# Patient Record
Sex: MALE | Race: WHITE | Employment: OTHER | URBAN - METROPOLITAN AREA
[De-identification: names, ages, dates, MRNs, and addresses within clinical notes are randomized per-mention and may not be internally consistent; named-entity substitution may affect disease eponyms.]

---

## 2020-10-12 ENCOUNTER — APPOINTMENT (OUTPATIENT)
Dept: CT IMAGING | Age: 70
DRG: 917 | End: 2020-10-12
Payer: MEDICARE

## 2020-10-12 ENCOUNTER — HOSPITAL ENCOUNTER (INPATIENT)
Age: 70
LOS: 5 days | Discharge: HOME | DRG: 917 | End: 2020-10-17
Attending: EMERGENCY MEDICINE | Admitting: INTERNAL MEDICINE
Payer: MEDICARE

## 2020-10-12 PROBLEM — R41.82 ALTERED MENTAL STATUS: Status: ACTIVE | Noted: 2020-10-12

## 2020-10-12 LAB
-: NORMAL
-: NORMAL
ABSOLUTE EOS #: <0.03 K/UL (ref 0–0.44)
ABSOLUTE IMMATURE GRANULOCYTE: 0.1 K/UL (ref 0–0.3)
ABSOLUTE LYMPH #: 0.93 K/UL (ref 1.1–3.7)
ABSOLUTE MONO #: 0.57 K/UL (ref 0.1–1.2)
ALBUMIN SERPL-MCNC: 4.6 G/DL (ref 3.5–5.2)
ALBUMIN/GLOBULIN RATIO: NORMAL (ref 1–2.5)
ALP BLD-CCNC: 65 U/L (ref 40–129)
ALT SERPL-CCNC: 8 U/L (ref 5–41)
AMORPHOUS: NORMAL
AMPHETAMINE SCREEN URINE: NEGATIVE
AMYLASE: 53 U/L (ref 28–100)
ANION GAP SERPL CALCULATED.3IONS-SCNC: 14 MMOL/L (ref 9–17)
AST SERPL-CCNC: 11 U/L
BACTERIA: NORMAL
BARBITURATE SCREEN URINE: NEGATIVE
BASOPHILS # BLD: 0 % (ref 0–2)
BASOPHILS ABSOLUTE: 0.05 K/UL (ref 0–0.2)
BENZODIAZEPINE SCREEN, URINE: NEGATIVE
BILIRUB SERPL-MCNC: 0.73 MG/DL (ref 0.3–1.2)
BILIRUBIN DIRECT: 0.2 MG/DL
BILIRUBIN URINE: NEGATIVE
BILIRUBIN, INDIRECT: 0.53 MG/DL (ref 0–1)
BUN BLDV-MCNC: 46 MG/DL (ref 8–23)
BUN/CREAT BLD: 32 (ref 9–20)
BUPRENORPHINE URINE: ABNORMAL
CALCIUM SERPL-MCNC: 10.4 MG/DL (ref 8.6–10.4)
CANNABINOID SCREEN URINE: POSITIVE
CASTS UA: NORMAL /LPF
CASTS UA: NORMAL /LPF
CHLORIDE BLD-SCNC: 102 MMOL/L (ref 98–107)
CO2: 22 MMOL/L (ref 20–31)
COCAINE METABOLITE, URINE: NEGATIVE
COLOR: YELLOW
COMMENT UA: ABNORMAL
CREAT SERPL-MCNC: 1.46 MG/DL (ref 0.7–1.2)
CRYSTALS, UA: NORMAL /HPF
DIFFERENTIAL TYPE: ABNORMAL
EOSINOPHILS RELATIVE PERCENT: 0 % (ref 1–4)
EPITHELIAL CELLS UA: NORMAL /HPF (ref 0–5)
GFR AFRICAN AMERICAN: 58 ML/MIN
GFR NON-AFRICAN AMERICAN: 48 ML/MIN
GFR SERPL CREATININE-BSD FRML MDRD: ABNORMAL ML/MIN/{1.73_M2}
GFR SERPL CREATININE-BSD FRML MDRD: ABNORMAL ML/MIN/{1.73_M2}
GLOBULIN: NORMAL G/DL (ref 1.5–3.8)
GLUCOSE BLD-MCNC: 175 MG/DL (ref 75–110)
GLUCOSE BLD-MCNC: 185 MG/DL (ref 70–99)
GLUCOSE URINE: ABNORMAL
HCT VFR BLD CALC: 48.7 % (ref 40.7–50.3)
HEMOGLOBIN: 16.1 G/DL (ref 13–17)
IMMATURE GRANULOCYTES: 1 %
KETONES, URINE: ABNORMAL
LACTIC ACID: 1.5 MMOL/L (ref 0.5–2.2)
LACTIC ACID: 2.6 MMOL/L (ref 0.5–2.2)
LEUKOCYTE ESTERASE, URINE: NEGATIVE
LIPASE: 15 U/L (ref 13–60)
LYMPHOCYTES # BLD: 7 % (ref 24–43)
MCH RBC QN AUTO: 30 PG (ref 25.2–33.5)
MCHC RBC AUTO-ENTMCNC: 33.1 G/DL (ref 28.4–34.8)
MCV RBC AUTO: 90.7 FL (ref 82.6–102.9)
MDMA URINE: ABNORMAL
METHADONE SCREEN, URINE: NEGATIVE
METHAMPHETAMINE, URINE: ABNORMAL
MONOCYTES # BLD: 4 % (ref 3–12)
MUCUS: NORMAL
MYOGLOBIN: 184 NG/ML (ref 28–72)
NITRITE, URINE: NEGATIVE
NRBC AUTOMATED: 0 PER 100 WBC
OPIATES, URINE: NEGATIVE
OTHER OBSERVATIONS UA: NORMAL
OXYCODONE SCREEN URINE: POSITIVE
PDW BLD-RTO: 13.2 % (ref 11.8–14.4)
PH UA: 6 (ref 5–8)
PHENCYCLIDINE, URINE: NEGATIVE
PLATELET # BLD: 335 K/UL (ref 138–453)
PLATELET ESTIMATE: ABNORMAL
PMV BLD AUTO: 9.2 FL (ref 8.1–13.5)
POTASSIUM SERPL-SCNC: 4.6 MMOL/L (ref 3.7–5.3)
PROPOXYPHENE, URINE: ABNORMAL
PROTEIN UA: ABNORMAL
RBC # BLD: 5.37 M/UL (ref 4.21–5.77)
RBC # BLD: ABNORMAL 10*6/UL
RBC UA: NORMAL /HPF (ref 0–2)
REASON FOR REJECTION: NORMAL
RENAL EPITHELIAL, UA: NORMAL /HPF
SARS-COV-2, RAPID: NOT DETECTED
SARS-COV-2: NORMAL
SARS-COV-2: NORMAL
SEG NEUTROPHILS: 88 % (ref 36–65)
SEGMENTED NEUTROPHILS ABSOLUTE COUNT: 12.42 K/UL (ref 1.5–8.1)
SODIUM BLD-SCNC: 138 MMOL/L (ref 135–144)
SOURCE: NORMAL
SPECIFIC GRAVITY UA: 1.02 (ref 1–1.03)
TEST INFORMATION: ABNORMAL
TOTAL PROTEIN: 7.2 G/DL (ref 6.4–8.3)
TRICHOMONAS: NORMAL
TRICYCLIC ANTIDEPRESSANTS, UR: ABNORMAL
TROPONIN INTERP: ABNORMAL
TROPONIN T: ABNORMAL NG/ML
TROPONIN, HIGH SENSITIVITY: 93 NG/L (ref 0–22)
TURBIDITY: CLEAR
URINE HGB: NEGATIVE
UROBILINOGEN, URINE: NORMAL
WBC # BLD: 14.1 K/UL (ref 3.5–11.3)
WBC # BLD: ABNORMAL 10*3/UL
WBC UA: NORMAL /HPF (ref 0–5)
YEAST: NORMAL
ZZ NTE CLEAN UP: ORDERED TEST: NORMAL
ZZ NTE WITH NAME CLEAN UP: SPECIMEN SOURCE: NORMAL

## 2020-10-12 PROCEDURE — 83690 ASSAY OF LIPASE: CPT

## 2020-10-12 PROCEDURE — 93005 ELECTROCARDIOGRAM TRACING: CPT | Performed by: NURSE PRACTITIONER

## 2020-10-12 PROCEDURE — 84484 ASSAY OF TROPONIN QUANT: CPT

## 2020-10-12 PROCEDURE — 80076 HEPATIC FUNCTION PANEL: CPT

## 2020-10-12 PROCEDURE — 80307 DRUG TEST PRSMV CHEM ANLYZR: CPT

## 2020-10-12 PROCEDURE — 85025 COMPLETE CBC W/AUTO DIFF WBC: CPT

## 2020-10-12 PROCEDURE — U0002 COVID-19 LAB TEST NON-CDC: HCPCS

## 2020-10-12 PROCEDURE — 99285 EMERGENCY DEPT VISIT HI MDM: CPT

## 2020-10-12 PROCEDURE — 84165 PROTEIN E-PHORESIS SERUM: CPT

## 2020-10-12 PROCEDURE — 83605 ASSAY OF LACTIC ACID: CPT

## 2020-10-12 PROCEDURE — 82150 ASSAY OF AMYLASE: CPT

## 2020-10-12 PROCEDURE — 84155 ASSAY OF PROTEIN SERUM: CPT

## 2020-10-12 PROCEDURE — 81001 URINALYSIS AUTO W/SCOPE: CPT

## 2020-10-12 PROCEDURE — 84156 ASSAY OF PROTEIN URINE: CPT

## 2020-10-12 PROCEDURE — 70450 CT HEAD/BRAIN W/O DYE: CPT

## 2020-10-12 PROCEDURE — 2580000003 HC RX 258: Performed by: NURSE PRACTITIONER

## 2020-10-12 PROCEDURE — 6360000002 HC RX W HCPCS: Performed by: NURSE PRACTITIONER

## 2020-10-12 PROCEDURE — 83874 ASSAY OF MYOGLOBIN: CPT

## 2020-10-12 PROCEDURE — 80048 BASIC METABOLIC PNL TOTAL CA: CPT

## 2020-10-12 PROCEDURE — 82947 ASSAY GLUCOSE BLOOD QUANT: CPT

## 2020-10-12 PROCEDURE — 2060000000 HC ICU INTERMEDIATE R&B

## 2020-10-12 PROCEDURE — 84166 PROTEIN E-PHORESIS/URINE/CSF: CPT

## 2020-10-12 PROCEDURE — 86334 IMMUNOFIX E-PHORESIS SERUM: CPT

## 2020-10-12 RX ORDER — ACETAMINOPHEN 325 MG/1
650 TABLET ORAL EVERY 4 HOURS PRN
Status: DISCONTINUED | OUTPATIENT
Start: 2020-10-12 | End: 2020-10-12 | Stop reason: SDUPTHER

## 2020-10-12 RX ORDER — INSULIN GLARGINE 300 U/ML
40 INJECTION, SOLUTION SUBCUTANEOUS DAILY
COMMUNITY

## 2020-10-12 RX ORDER — PREGABALIN 25 MG/1
25 CAPSULE ORAL DAILY
COMMUNITY

## 2020-10-12 RX ORDER — SODIUM CHLORIDE 9 MG/ML
INJECTION, SOLUTION INTRAVENOUS CONTINUOUS
Status: DISCONTINUED | OUTPATIENT
Start: 2020-10-12 | End: 2020-10-17 | Stop reason: HOSPADM

## 2020-10-12 RX ORDER — SODIUM CHLORIDE 0.9 % (FLUSH) 0.9 %
10 SYRINGE (ML) INJECTION EVERY 12 HOURS SCHEDULED
Status: DISCONTINUED | OUTPATIENT
Start: 2020-10-12 | End: 2020-10-17 | Stop reason: HOSPADM

## 2020-10-12 RX ORDER — MORPHINE SULFATE 4 MG/ML
4 INJECTION, SOLUTION INTRAMUSCULAR; INTRAVENOUS ONCE
Status: COMPLETED | OUTPATIENT
Start: 2020-10-12 | End: 2020-10-12

## 2020-10-12 RX ORDER — LEVOTHYROXINE SODIUM 0.03 MG/1
25 TABLET ORAL
COMMUNITY

## 2020-10-12 RX ORDER — LEVOTHYROXINE SODIUM 0.03 MG/1
25 TABLET ORAL
Status: DISCONTINUED | OUTPATIENT
Start: 2020-10-13 | End: 2020-10-17 | Stop reason: HOSPADM

## 2020-10-12 RX ORDER — AMITRIPTYLINE HYDROCHLORIDE 50 MG/1
50 TABLET, FILM COATED ORAL 2 TIMES DAILY
COMMUNITY

## 2020-10-12 RX ORDER — ONDANSETRON 2 MG/ML
4 INJECTION INTRAMUSCULAR; INTRAVENOUS EVERY 6 HOURS PRN
Status: DISCONTINUED | OUTPATIENT
Start: 2020-10-12 | End: 2020-10-17 | Stop reason: HOSPADM

## 2020-10-12 RX ORDER — CLOPIDOGREL BISULFATE 75 MG/1
75 TABLET ORAL DAILY
Status: DISCONTINUED | OUTPATIENT
Start: 2020-10-13 | End: 2020-10-17 | Stop reason: HOSPADM

## 2020-10-12 RX ORDER — ONDANSETRON 2 MG/ML
4 INJECTION INTRAMUSCULAR; INTRAVENOUS EVERY 8 HOURS PRN
Status: DISCONTINUED | OUTPATIENT
Start: 2020-10-12 | End: 2020-10-12 | Stop reason: SDUPTHER

## 2020-10-12 RX ORDER — ONDANSETRON 2 MG/ML
4 INJECTION INTRAMUSCULAR; INTRAVENOUS ONCE
Status: COMPLETED | OUTPATIENT
Start: 2020-10-12 | End: 2020-10-12

## 2020-10-12 RX ORDER — OXYCODONE HYDROCHLORIDE AND ACETAMINOPHEN 5; 325 MG/1; MG/1
1 TABLET ORAL 2 TIMES DAILY PRN
COMMUNITY

## 2020-10-12 RX ORDER — SODIUM CHLORIDE 0.9 % (FLUSH) 0.9 %
10 SYRINGE (ML) INJECTION EVERY 12 HOURS SCHEDULED
Status: DISCONTINUED | OUTPATIENT
Start: 2020-10-12 | End: 2020-10-12 | Stop reason: SDUPTHER

## 2020-10-12 RX ORDER — 0.9 % SODIUM CHLORIDE 0.9 %
1000 INTRAVENOUS SOLUTION INTRAVENOUS ONCE
Status: COMPLETED | OUTPATIENT
Start: 2020-10-12 | End: 2020-10-12

## 2020-10-12 RX ORDER — ALPRAZOLAM 1 MG/1
1 TABLET ORAL DAILY
Status: ON HOLD | COMMUNITY
End: 2020-10-17 | Stop reason: HOSPADM

## 2020-10-12 RX ORDER — ACETAMINOPHEN 650 MG/1
650 SUPPOSITORY RECTAL EVERY 6 HOURS PRN
Status: DISCONTINUED | OUTPATIENT
Start: 2020-10-12 | End: 2020-10-17 | Stop reason: HOSPADM

## 2020-10-12 RX ORDER — AMITRIPTYLINE HYDROCHLORIDE 50 MG/1
50 TABLET, FILM COATED ORAL 2 TIMES DAILY
Status: DISCONTINUED | OUTPATIENT
Start: 2020-10-12 | End: 2020-10-17 | Stop reason: HOSPADM

## 2020-10-12 RX ORDER — ACETAMINOPHEN 325 MG/1
650 TABLET ORAL EVERY 6 HOURS PRN
Status: DISCONTINUED | OUTPATIENT
Start: 2020-10-12 | End: 2020-10-17 | Stop reason: HOSPADM

## 2020-10-12 RX ORDER — PROMETHAZINE HYDROCHLORIDE 25 MG/1
12.5 TABLET ORAL EVERY 6 HOURS PRN
Status: DISCONTINUED | OUTPATIENT
Start: 2020-10-12 | End: 2020-10-17 | Stop reason: HOSPADM

## 2020-10-12 RX ORDER — CLOPIDOGREL BISULFATE 75 MG/1
75 TABLET ORAL DAILY
COMMUNITY

## 2020-10-12 RX ORDER — POLYETHYLENE GLYCOL 3350 17 G/17G
17 POWDER, FOR SOLUTION ORAL DAILY PRN
Status: DISCONTINUED | OUTPATIENT
Start: 2020-10-12 | End: 2020-10-17 | Stop reason: HOSPADM

## 2020-10-12 RX ORDER — SODIUM CHLORIDE 0.9 % (FLUSH) 0.9 %
10 SYRINGE (ML) INJECTION PRN
Status: DISCONTINUED | OUTPATIENT
Start: 2020-10-12 | End: 2020-10-12 | Stop reason: SDUPTHER

## 2020-10-12 RX ORDER — SODIUM CHLORIDE 0.9 % (FLUSH) 0.9 %
10 SYRINGE (ML) INJECTION PRN
Status: DISCONTINUED | OUTPATIENT
Start: 2020-10-12 | End: 2020-10-17 | Stop reason: HOSPADM

## 2020-10-12 RX ORDER — PREGABALIN 25 MG/1
25 CAPSULE ORAL DAILY
Status: DISCONTINUED | OUTPATIENT
Start: 2020-10-13 | End: 2020-10-17 | Stop reason: HOSPADM

## 2020-10-12 RX ADMIN — MORPHINE SULFATE 4 MG: 4 INJECTION, SOLUTION INTRAMUSCULAR; INTRAVENOUS at 16:34

## 2020-10-12 RX ADMIN — SODIUM CHLORIDE 1000 ML: 9 INJECTION, SOLUTION INTRAVENOUS at 14:40

## 2020-10-12 RX ADMIN — SODIUM CHLORIDE: 9 INJECTION, SOLUTION INTRAVENOUS at 22:06

## 2020-10-12 RX ADMIN — ONDANSETRON 4 MG: 2 INJECTION INTRAMUSCULAR; INTRAVENOUS at 15:31

## 2020-10-12 RX ADMIN — SODIUM CHLORIDE 1000 ML: 9 INJECTION, SOLUTION INTRAVENOUS at 11:23

## 2020-10-12 ASSESSMENT — ENCOUNTER SYMPTOMS
CONSTIPATION: 0
NAUSEA: 0
VOMITING: 0
DIARRHEA: 0
COLOR CHANGE: 0
COUGH: 0
SHORTNESS OF BREATH: 0
RHINORRHEA: 0
SORE THROAT: 0
WHEEZING: 0
SINUS PRESSURE: 0
ABDOMINAL PAIN: 0

## 2020-10-12 ASSESSMENT — PAIN SCALES - GENERAL
PAINLEVEL_OUTOF10: 0
PAINLEVEL_OUTOF10: 4
PAINLEVEL_OUTOF10: 10

## 2020-10-12 NOTE — ED PROVIDER NOTES
Neck:      Musculoskeletal: Normal range of motion and neck supple. Cardiovascular:      Rate and Rhythm: Normal rate and regular rhythm. Pulmonary:      Effort: Pulmonary effort is normal. No respiratory distress. Breath sounds: Normal breath sounds. No stridor. Abdominal:      General: Bowel sounds are normal.      Palpations: Abdomen is soft. Musculoskeletal: Normal range of motion. Lymphadenopathy:      Cervical: No cervical adenopathy. Skin:     General: Skin is warm and dry. Findings: No rash. Neurological:      Mental Status: He is alert and oriented to person, place, and time. RADIOLOGY:   Non-plain film images such as CT, Ultrasound and MRI are read by the radiologist. [facility] radiographic images are visualized and preliminarily interpreted by the emergency physician with the below findings:    Ct Head Wo Contrast    Result Date: 10/12/2020  EXAMINATION: CT OF THE HEAD WITHOUT CONTRAST  10/12/2020 12:30 pm TECHNIQUE: CT of the head was performed without the administration of intravenous contrast. Dose modulation, iterative reconstruction, and/or weight based adjustment of the mA/kV was utilized to reduce the radiation dose to as low as reasonably achievable. COMPARISON: None. HISTORY: ORDERING SYSTEM PROVIDED HISTORY: AMS TECHNOLOGIST PROVIDED HISTORY: AMS Reason for Exam: ams Acuity: Unknown Type of Exam: Unknown FINDINGS: BRAIN/VENTRICLES: The ventricles and sulci are diffusely enlarged. Low attenuation is seen in the periventricular and subcortical white matter. No acute intracranial hemorrhage or acute infarct is identified. ORBITS: The visualized portion of the orbits demonstrate no acute abnormality. SINUSES: The visualized paranasal sinuses and mastoid air cells demonstrate no acute abnormality. SOFT TISSUES/SKULL:  No acute abnormality of the visualized skull or soft tissues. No acute intracranial abnormality.      Interpretation per the Radiologist below, if available at the time of this note:    CT HEAD WO CONTRAST   Final Result   No acute intracranial abnormality. LABS:  Labs Reviewed   CBC WITH AUTO DIFFERENTIAL - Abnormal; Notable for the following components:       Result Value    WBC 14.1 (*)     Seg Neutrophils 88 (*)     Lymphocytes 7 (*)     Eosinophils % 0 (*)     Immature Granulocytes 1 (*)     Segs Absolute 12.42 (*)     Absolute Lymph # 0.93 (*)     All other components within normal limits   URINE RT REFLEX TO CULTURE - Abnormal; Notable for the following components:    Glucose, Ur 1+ (*)     Ketones, Urine 1+ (*)     Protein, UA 2+ (*)     All other components within normal limits   LACTIC ACID - Abnormal; Notable for the following components:    Lactic Acid 2.6 (*)     All other components within normal limits   BASIC METABOLIC PANEL - Abnormal; Notable for the following components:    Glucose 185 (*)     BUN 46 (*)     CREATININE 1.46 (*)     Bun/Cre Ratio 32 (*)     GFR Non- 48 (*)     GFR  58 (*)     All other components within normal limits   POC GLUCOSE FINGERSTICK - Abnormal; Notable for the following components:    POC Glucose 175 (*)     All other components within normal limits   AMYLASE   LIPASE   HEPATIC FUNCTION PANEL   SPECIMEN REJECTION   MICROSCOPIC URINALYSIS   LACTIC ACID   COVID-19       All other labs were within normal range or not returned as of this dictation.     EMERGENCY DEPARTMENT COURSE and DIFFERENTIAL DIAGNOSIS/MDM:   Vitals:    Vitals:    10/12/20 1006 10/12/20 1500   BP: (!) 115/100 138/87   Pulse:  78   Resp: 16 16   Temp: 98.5 °F (36.9 °C)    TempSrc: Oral    SpO2: 96% 100%   Weight: 120 lb (54.4 kg)    Height: 5' 9\" (1.753 m)        Medical Decision Making:  Medications   0.9 % sodium chloride bolus (0 mLs Intravenous Stopped 10/12/20 1241)   ondansetron (ZOFRAN) injection 4 mg (4 mg Intravenous Given 10/12/20 1531)   0.9 % sodium chloride bolus (0 mLs Intravenous Stopped 10/12/20 1531)   morphine sulfate (PF) injection 4 mg (4 mg Intravenous Given 10/12/20 1634)       CONSULTS:  IP CONSULT TO INTERNAL MEDICINE        FINAL IMPRESSION      1. Altered mental status, unspecified altered mental status type    2. Nausea and vomiting, intractability of vomiting not specified, unspecified vomiting type          DISPOSITION/PLAN   DISPOSITION Admitted 10/12/2020 03:16:03 PM      PATIENT REFERRED TO:   No follow-up provider specified.     DISCHARGE MEDICATIONS:     New Prescriptions    No medications on file           (Please note that portions of this note were completed with a voice recognition program.  Efforts were made to edit the dictations but occasionally words are mis-transcribed.)    Enriqueta Gutierrez NP, APRN - CNP  Certified Nurse Practitioner          ANDRADE Romano - CNP  10/12/20 5759

## 2020-10-12 NOTE — PROGRESS NOTES
Transitions of Care Pharmacy Service   Medication Review    The patient's list of current home medications has been reviewed. Source(s) of information: patient, [employer], [facility]      Please feel free to call me with any questions about this encounter. Thank you. Tyra Mccormick [facility]   Transitions of Care Pharmacy Service  Phone:  [phone]  Fax: [phone]      Electronically signed by Tyra Mccormick [facility] on 10/12/2020 at 3:47 PM         Prior to Admission medications    Medication Sig       ALPRAZolam (XANAX) 1 MG tablet Take 1 mg by mouth daily. pregabalin (LYRICA) 25 MG capsule Take 25 mg by mouth daily.        Insulin Glargine, 1 Unit Dial, (TOUJEO SOLOSTAR) 300 UNIT/ML SOPN Inject 40 Units into the skin daily       amitriptyline (ELAVIL) 50 MG tablet Take 50 mg by mouth 2 times daily       oxyCODONE-acetaminophen (PERCOCET) 5-325 MG per tablet Take 1 tablet by mouth 2 times daily as needed for Pain.       levothyroxine (SYNTHROID) 25 MCG tablet Take 25 mcg by mouth every morning (before breakfast)       clopidogrel (PLAVIX) 75 MG tablet Take 75 mg by mouth daily

## 2020-10-12 NOTE — ED PROVIDER NOTES
[facility] ED  EMERGENCY DEPARTMENT ENCOUNTER   ATTENDING ATTESTATION     Pt Name: Corinne Redd  MRN: [id number]  Kathie 1950  Date of evaluation: 10/12/20       Corinne Redd is a 69 y.o. male who presents with Altered Mental Status (x 2 days) and Emesis (x 3 days)      MDM:     Patient's EKG shows sinus rhythm with a rate of 82, IA QRS QTC intervals unremarkable and the patient has left axis deviation no ST elevations or depressions, no significant T wave changes. Nonspecific EKG. Vitals:   Vitals:    10/12/20 1006   BP: (!) 115/100   Resp: 16   Temp: 98.5 °F (36.9 °C)   TempSrc: Oral   SpO2: 96%   Weight: 120 lb (54.4 kg)   Height: 5' 9\" (1.753 m)         I personally evaluated and examined the patient in conjunction with the Midlevel provider and agree with the assessment, treatment plan, and disposition of the patient as recorded by the midlevel. I performed a history and physical examination of the patient and discussed management with the midlevel. I reviewed the midlevels note and agree with the documented findings and plan of care. Any areas of disagreement are noted on the chart. I was personally present for the key portions of any procedures. I have documented in the chart those procedures where I was not present during the key portions. I have personally reviewed all images and agree with the midlevel's interpretation. I have reviewed the emergency nurses triage note. I agree with the chief complaint, past medical history, past surgical history, allergies, medications, social and family history as documented unless otherwise noted.     Ellie Jamil MD  Attending Emergency  Physician                  Jay Heart MD  10/12/20 8262

## 2020-10-12 NOTE — H&P
History and Physical/admit note      CHIEF COMPLAINT: Confusion dysarthria    History of Present Illness: 2-day history of confusion dysarthria no weakness in the arms on the legs no area of slurred speech has been having recurrent falls question no syncope, patient with known coronary artery disease and previous stents, denies any seizure disorder no dizziness, has been having 2 to 3 days of nausea vomiting and diarrhea vague upper abdominal pain no blood in the stools and no melena no hematemesis denies any fever chills no cough        Past Medical History:   Diagnosis Date    CAD (coronary artery disease)     Diabetes mellitus ([address]     Headache     Hyperlipidemia     Hypertension     Kidney stone          Past Surgical History:   Procedure Laterality Date    CARDIAC SURGERY      CHOLECYSTECTOMY         Medications Prior to Admission:    Not in a hospital admission. Allergies:    Patient has no known allergies. Social History:    reports that he has never smoked. He has never used smokeless tobacco. He reports current alcohol use. He reports that he does not use drugs. Family History:   family history includes Cancer in his sister; Coronary Art Dis in his father; Heart Attack in his father; Mental Illness in his mother.     REVIEW OF SYSTEMS:  Constitutional: negative, Fever no chills  Eyes: negative  Ears, nose, mouth, throat, and face: negative  Respiratory: negative, Cough no shortness of breath no wheezing  Cardiovascular: negative, Chest pain no palpitation positive syncope  Gastrointestinal: negative, Nausea vomiting diarrhea resolved x2 days  Genitourinary:negative  Integument/breast: negative  Hematologic/lymphatic: negative  Musculoskeletal:negative  Neurological: negative, Headache no seizures  Behavioral/Psych: negative  Endocrine: negative, Polyuria no polydipsia no hypoglycemia  Allergic/Immunologic: negative  PHYSICAL EXAM:  General Appearance: in no acute distress and neurology consultation aspirin EPC cuffs neurochecks beta-blocker            Tyree Elizondo MD  5:39 PM  10/12/2020

## 2020-10-13 ENCOUNTER — APPOINTMENT (OUTPATIENT)
Dept: MRI IMAGING | Age: 70
DRG: 917 | End: 2020-10-13
Payer: MEDICARE

## 2020-10-13 ENCOUNTER — APPOINTMENT (OUTPATIENT)
Dept: ULTRASOUND IMAGING | Age: 70
DRG: 917 | End: 2020-10-13
Payer: MEDICARE

## 2020-10-13 PROBLEM — Z79.4 TYPE 2 DIABETES MELLITUS WITH DIABETIC NEUROPATHY, WITH LONG-TERM CURRENT USE OF INSULIN (HCC): Status: ACTIVE | Noted: 2020-10-13

## 2020-10-13 PROBLEM — E11.40 TYPE 2 DIABETES MELLITUS WITH DIABETIC NEUROPATHY, WITH LONG-TERM CURRENT USE OF INSULIN (HCC): Status: ACTIVE | Noted: 2020-10-13

## 2020-10-13 PROBLEM — G93.41 METABOLIC ENCEPHALOPATHY: Status: ACTIVE | Noted: 2020-10-13

## 2020-10-13 PROBLEM — R29.6 FALLING EPISODES: Status: ACTIVE | Noted: 2020-10-13

## 2020-10-13 LAB
-: NORMAL
ABSOLUTE EOS #: <0.03 K/UL (ref 0–0.44)
ABSOLUTE IMMATURE GRANULOCYTE: 0.08 K/UL (ref 0–0.3)
ABSOLUTE LYMPH #: 0.87 K/UL (ref 1.1–3.7)
ABSOLUTE MONO #: 0.67 K/UL (ref 0.1–1.2)
ALBUMIN SERPL-MCNC: 4.6 G/DL (ref 3.5–5.2)
ALBUMIN/GLOBULIN RATIO: ABNORMAL (ref 1–2.5)
ALP BLD-CCNC: 60 U/L (ref 40–129)
ALT SERPL-CCNC: 9 U/L (ref 5–41)
AMORPHOUS: NORMAL
AMPHETAMINE SCREEN URINE: NEGATIVE
ANION GAP SERPL CALCULATED.3IONS-SCNC: 14 MMOL/L (ref 9–17)
AST SERPL-CCNC: 15 U/L
BACTERIA: NORMAL
BARBITURATE SCREEN URINE: NEGATIVE
BASOPHILS # BLD: 0 % (ref 0–2)
BASOPHILS ABSOLUTE: 0.04 K/UL (ref 0–0.2)
BENZODIAZEPINE SCREEN, URINE: NEGATIVE
BILIRUB SERPL-MCNC: 0.82 MG/DL (ref 0.3–1.2)
BILIRUBIN URINE: NEGATIVE
BUN BLDV-MCNC: 38 MG/DL (ref 8–23)
BUN/CREAT BLD: 36 (ref 9–20)
BUPRENORPHINE URINE: ABNORMAL
CALCIUM SERPL-MCNC: 9.5 MG/DL (ref 8.6–10.4)
CANNABINOID SCREEN URINE: POSITIVE
CASTS UA: NORMAL /LPF
CHLORIDE BLD-SCNC: 103 MMOL/L (ref 98–107)
CO2: 23 MMOL/L (ref 20–31)
COCAINE METABOLITE, URINE: NEGATIVE
COLOR: YELLOW
COMMENT UA: ABNORMAL
CREAT SERPL-MCNC: 1.06 MG/DL (ref 0.7–1.2)
CRYSTALS, UA: NORMAL /HPF
DIFFERENTIAL TYPE: ABNORMAL
EKG ATRIAL RATE: 82 BPM
EKG ATRIAL RATE: 86 BPM
EKG P AXIS: 71 DEGREES
EKG P AXIS: 73 DEGREES
EKG P-R INTERVAL: 154 MS
EKG P-R INTERVAL: 162 MS
EKG Q-T INTERVAL: 366 MS
EKG Q-T INTERVAL: 392 MS
EKG QRS DURATION: 118 MS
EKG QRS DURATION: 122 MS
EKG QTC CALCULATION (BAZETT): 437 MS
EKG QTC CALCULATION (BAZETT): 457 MS
EKG R AXIS: -11 DEGREES
EKG R AXIS: -37 DEGREES
EKG T AXIS: 116 DEGREES
EKG T AXIS: 130 DEGREES
EKG VENTRICULAR RATE: 82 BPM
EKG VENTRICULAR RATE: 86 BPM
EOSINOPHILS RELATIVE PERCENT: 0 % (ref 1–4)
EPITHELIAL CELLS UA: NORMAL /HPF (ref 0–5)
GFR AFRICAN AMERICAN: >60 ML/MIN
GFR NON-AFRICAN AMERICAN: >60 ML/MIN
GFR SERPL CREATININE-BSD FRML MDRD: ABNORMAL ML/MIN/{1.73_M2}
GFR SERPL CREATININE-BSD FRML MDRD: ABNORMAL ML/MIN/{1.73_M2}
GLUCOSE BLD-MCNC: 119 MG/DL (ref 75–110)
GLUCOSE BLD-MCNC: 119 MG/DL (ref 75–110)
GLUCOSE BLD-MCNC: 138 MG/DL (ref 70–99)
GLUCOSE BLD-MCNC: 138 MG/DL (ref 75–110)
GLUCOSE URINE: ABNORMAL
HCT VFR BLD CALC: 42.8 % (ref 40.7–50.3)
HEMOGLOBIN: 14.4 G/DL (ref 13–17)
IMMATURE GRANULOCYTES: 1 %
KETONES, URINE: ABNORMAL
LEUKOCYTE ESTERASE, URINE: NEGATIVE
LV EF: 55 %
LVEF MODALITY: NORMAL
LYMPHOCYTES # BLD: 7 % (ref 24–43)
MAGNESIUM: 2.2 MG/DL (ref 1.6–2.6)
MCH RBC QN AUTO: 30.6 PG (ref 25.2–33.5)
MCHC RBC AUTO-ENTMCNC: 33.6 G/DL (ref 28.4–34.8)
MCV RBC AUTO: 90.9 FL (ref 82.6–102.9)
MDMA URINE: ABNORMAL
METHADONE SCREEN, URINE: NEGATIVE
METHAMPHETAMINE, URINE: ABNORMAL
MONOCYTES # BLD: 6 % (ref 3–12)
MUCUS: NORMAL
MYOGLOBIN: 190 NG/ML (ref 28–72)
MYOGLOBIN: 195 NG/ML (ref 28–72)
NITRITE, URINE: NEGATIVE
NRBC AUTOMATED: 0 PER 100 WBC
OPIATES, URINE: POSITIVE
OTHER OBSERVATIONS UA: NORMAL
OXYCODONE SCREEN URINE: POSITIVE
PDW BLD-RTO: 12.9 % (ref 11.8–14.4)
PH UA: 6.5 (ref 5–8)
PHENCYCLIDINE, URINE: NEGATIVE
PLATELET # BLD: 286 K/UL (ref 138–453)
PLATELET ESTIMATE: ABNORMAL
PMV BLD AUTO: 9 FL (ref 8.1–13.5)
POTASSIUM SERPL-SCNC: 3.5 MMOL/L (ref 3.7–5.3)
PROPOXYPHENE, URINE: ABNORMAL
PROTEIN UA: ABNORMAL
RBC # BLD: 4.71 M/UL (ref 4.21–5.77)
RBC # BLD: ABNORMAL 10*6/UL
RBC UA: NORMAL /HPF (ref 0–2)
RENAL EPITHELIAL, UA: NORMAL /HPF
SEG NEUTROPHILS: 86 % (ref 36–65)
SEGMENTED NEUTROPHILS ABSOLUTE COUNT: 10.49 K/UL (ref 1.5–8.1)
SODIUM BLD-SCNC: 140 MMOL/L (ref 135–144)
SPECIFIC GRAVITY UA: 1.02 (ref 1–1.03)
T4 TOTAL: 8.5 UG/DL (ref 4.5–10.9)
TEST INFORMATION: ABNORMAL
TOTAL PROTEIN: 7 G/DL (ref 6.4–8.3)
TRICHOMONAS: NORMAL
TRICYCLIC ANTIDEPRESSANTS, UR: ABNORMAL
TROPONIN INTERP: ABNORMAL
TROPONIN INTERP: ABNORMAL
TROPONIN T: ABNORMAL NG/ML
TROPONIN T: ABNORMAL NG/ML
TROPONIN, HIGH SENSITIVITY: 121 NG/L (ref 0–22)
TROPONIN, HIGH SENSITIVITY: 150 NG/L (ref 0–22)
TSH SERPL DL<=0.05 MIU/L-ACNC: 2.29 MIU/L (ref 0.3–5)
TURBIDITY: CLEAR
URINE HGB: ABNORMAL
UROBILINOGEN, URINE: NORMAL
WBC # BLD: 12.2 K/UL (ref 3.5–11.3)
WBC # BLD: ABNORMAL 10*3/UL
WBC UA: NORMAL /HPF (ref 0–5)
YEAST: NORMAL

## 2020-10-13 PROCEDURE — 93970 EXTREMITY STUDY: CPT

## 2020-10-13 PROCEDURE — 6370000000 HC RX 637 (ALT 250 FOR IP): Performed by: INTERNAL MEDICINE

## 2020-10-13 PROCEDURE — 80053 COMPREHEN METABOLIC PANEL: CPT

## 2020-10-13 PROCEDURE — 97116 GAIT TRAINING THERAPY: CPT

## 2020-10-13 PROCEDURE — 2060000000 HC ICU INTERMEDIATE R&B

## 2020-10-13 PROCEDURE — 2580000003 HC RX 258: Performed by: INTERNAL MEDICINE

## 2020-10-13 PROCEDURE — 82947 ASSAY GLUCOSE BLOOD QUANT: CPT

## 2020-10-13 PROCEDURE — 92523 SPEECH SOUND LANG COMPREHEN: CPT

## 2020-10-13 PROCEDURE — 36415 COLL VENOUS BLD VENIPUNCTURE: CPT

## 2020-10-13 PROCEDURE — 93005 ELECTROCARDIOGRAM TRACING: CPT | Performed by: INTERNAL MEDICINE

## 2020-10-13 PROCEDURE — 84436 ASSAY OF TOTAL THYROXINE: CPT

## 2020-10-13 PROCEDURE — 97162 PT EVAL MOD COMPLEX 30 MIN: CPT

## 2020-10-13 PROCEDURE — 93306 TTE W/DOPPLER COMPLETE: CPT

## 2020-10-13 PROCEDURE — 76775 US EXAM ABDO BACK WALL LIM: CPT

## 2020-10-13 PROCEDURE — 83874 ASSAY OF MYOGLOBIN: CPT

## 2020-10-13 PROCEDURE — 92610 EVALUATE SWALLOWING FUNCTION: CPT

## 2020-10-13 PROCEDURE — 99222 1ST HOSP IP/OBS MODERATE 55: CPT | Performed by: PSYCHIATRY & NEUROLOGY

## 2020-10-13 PROCEDURE — 6360000002 HC RX W HCPCS: Performed by: INTERNAL MEDICINE

## 2020-10-13 PROCEDURE — 83735 ASSAY OF MAGNESIUM: CPT

## 2020-10-13 PROCEDURE — 84484 ASSAY OF TROPONIN QUANT: CPT

## 2020-10-13 PROCEDURE — 81001 URINALYSIS AUTO W/SCOPE: CPT

## 2020-10-13 PROCEDURE — 84443 ASSAY THYROID STIM HORMONE: CPT

## 2020-10-13 PROCEDURE — 70551 MRI BRAIN STEM W/O DYE: CPT

## 2020-10-13 PROCEDURE — 93880 EXTRACRANIAL BILAT STUDY: CPT

## 2020-10-13 PROCEDURE — 93010 ELECTROCARDIOGRAM REPORT: CPT | Performed by: INTERNAL MEDICINE

## 2020-10-13 PROCEDURE — 2500000003 HC RX 250 WO HCPCS: Performed by: INTERNAL MEDICINE

## 2020-10-13 PROCEDURE — 80307 DRUG TEST PRSMV CHEM ANLYZR: CPT

## 2020-10-13 PROCEDURE — 85025 COMPLETE CBC W/AUTO DIFF WBC: CPT

## 2020-10-13 RX ORDER — HYDRALAZINE HYDROCHLORIDE 20 MG/ML
10 INJECTION INTRAMUSCULAR; INTRAVENOUS EVERY 6 HOURS PRN
Status: DISCONTINUED | OUTPATIENT
Start: 2020-10-13 | End: 2020-10-17 | Stop reason: HOSPADM

## 2020-10-13 RX ORDER — POTASSIUM CHLORIDE 7.45 MG/ML
10 INJECTION INTRAVENOUS
Status: COMPLETED | OUTPATIENT
Start: 2020-10-13 | End: 2020-10-13

## 2020-10-13 RX ORDER — ASPIRIN 81 MG/1
324 TABLET, CHEWABLE ORAL ONCE
Status: COMPLETED | OUTPATIENT
Start: 2020-10-13 | End: 2020-10-13

## 2020-10-13 RX ORDER — LORAZEPAM 0.5 MG/1
0.5 TABLET ORAL 2 TIMES DAILY PRN
Status: DISCONTINUED | OUTPATIENT
Start: 2020-10-13 | End: 2020-10-17 | Stop reason: HOSPADM

## 2020-10-13 RX ORDER — HYDROCHLOROTHIAZIDE 25 MG/1
25 TABLET ORAL DAILY
Status: DISCONTINUED | OUTPATIENT
Start: 2020-10-14 | End: 2020-10-14 | Stop reason: CLARIF

## 2020-10-13 RX ORDER — DEXTROSE MONOHYDRATE 25 G/50ML
12.5 INJECTION, SOLUTION INTRAVENOUS PRN
Status: DISCONTINUED | OUTPATIENT
Start: 2020-10-13 | End: 2020-10-17 | Stop reason: HOSPADM

## 2020-10-13 RX ORDER — OLMESARTAN MEDOXOMIL, AMLODIPINE AND HYDROCHLOROTHIAZIDE TABLET 40/5/25 MG 40; 5; 25 MG/1; MG/1; MG/1
1 TABLET ORAL DAILY
COMMUNITY

## 2020-10-13 RX ORDER — POTASSIUM CHLORIDE 20 MEQ/1
20 TABLET, EXTENDED RELEASE ORAL ONCE
Status: DISCONTINUED | OUTPATIENT
Start: 2020-10-13 | End: 2020-10-16

## 2020-10-13 RX ORDER — HYDROCHLOROTHIAZIDE 25 MG/1
25 TABLET ORAL DAILY
Status: DISCONTINUED | OUTPATIENT
Start: 2020-10-13 | End: 2020-10-13

## 2020-10-13 RX ORDER — POTASSIUM CHLORIDE 20 MEQ/1
20 TABLET, EXTENDED RELEASE ORAL 2 TIMES DAILY WITH MEALS
Status: DISCONTINUED | OUTPATIENT
Start: 2020-10-13 | End: 2020-10-13

## 2020-10-13 RX ORDER — DEXTROSE MONOHYDRATE 50 MG/ML
100 INJECTION, SOLUTION INTRAVENOUS PRN
Status: DISCONTINUED | OUTPATIENT
Start: 2020-10-13 | End: 2020-10-17 | Stop reason: HOSPADM

## 2020-10-13 RX ORDER — AMLODIPINE BESYLATE 5 MG/1
5 TABLET ORAL DAILY
Status: DISCONTINUED | OUTPATIENT
Start: 2020-10-13 | End: 2020-10-13

## 2020-10-13 RX ORDER — LOSARTAN POTASSIUM 100 MG/1
100 TABLET ORAL DAILY
Status: DISCONTINUED | OUTPATIENT
Start: 2020-10-13 | End: 2020-10-13

## 2020-10-13 RX ORDER — NICOTINE POLACRILEX 4 MG
15 LOZENGE BUCCAL PRN
Status: DISCONTINUED | OUTPATIENT
Start: 2020-10-13 | End: 2020-10-17 | Stop reason: HOSPADM

## 2020-10-13 RX ORDER — LOSARTAN POTASSIUM 100 MG/1
100 TABLET ORAL DAILY
Status: DISCONTINUED | OUTPATIENT
Start: 2020-10-14 | End: 2020-10-14 | Stop reason: CLARIF

## 2020-10-13 RX ORDER — AMLODIPINE BESYLATE 5 MG/1
5 TABLET ORAL 2 TIMES DAILY
Status: DISCONTINUED | OUTPATIENT
Start: 2020-10-14 | End: 2020-10-14 | Stop reason: CLARIF

## 2020-10-13 RX ADMIN — HYDRALAZINE HYDROCHLORIDE 10 MG: 20 INJECTION, SOLUTION INTRAMUSCULAR; INTRAVENOUS at 01:10

## 2020-10-13 RX ADMIN — METOPROLOL TARTRATE 25 MG: 25 TABLET, FILM COATED ORAL at 19:49

## 2020-10-13 RX ADMIN — ENOXAPARIN SODIUM 50 MG: 60 INJECTION SUBCUTANEOUS at 04:27

## 2020-10-13 RX ADMIN — FAMOTIDINE 20 MG: 10 INJECTION INTRAVENOUS at 19:48

## 2020-10-13 RX ADMIN — ASPIRIN 324 MG: 81 TABLET, CHEWABLE ORAL at 04:24

## 2020-10-13 RX ADMIN — PREGABALIN 25 MG: 25 CAPSULE ORAL at 11:10

## 2020-10-13 RX ADMIN — AMITRIPTYLINE HYDROCHLORIDE 50 MG: 50 TABLET, FILM COATED ORAL at 11:10

## 2020-10-13 RX ADMIN — HYDRALAZINE HYDROCHLORIDE 10 MG: 20 INJECTION, SOLUTION INTRAMUSCULAR; INTRAVENOUS at 08:36

## 2020-10-13 RX ADMIN — Medication 10 ML: at 19:48

## 2020-10-13 RX ADMIN — LOSARTAN POTASSIUM 100 MG: 100 TABLET, FILM COATED ORAL at 11:09

## 2020-10-13 RX ADMIN — POTASSIUM CHLORIDE 10 MEQ: 10 INJECTION, SOLUTION INTRAVENOUS at 18:19

## 2020-10-13 RX ADMIN — HYDROCHLOROTHIAZIDE 25 MG: 25 TABLET ORAL at 11:10

## 2020-10-13 RX ADMIN — AMITRIPTYLINE HYDROCHLORIDE 50 MG: 50 TABLET, FILM COATED ORAL at 19:48

## 2020-10-13 RX ADMIN — POTASSIUM CHLORIDE 10 MEQ: 10 INJECTION, SOLUTION INTRAVENOUS at 15:24

## 2020-10-13 RX ADMIN — AMLODIPINE BESYLATE 5 MG: 5 TABLET ORAL at 11:10

## 2020-10-13 RX ADMIN — CLOPIDOGREL 75 MG: 75 TABLET, FILM COATED ORAL at 11:09

## 2020-10-13 RX ADMIN — HYDRALAZINE HYDROCHLORIDE 10 MG: 20 INJECTION, SOLUTION INTRAMUSCULAR; INTRAVENOUS at 15:33

## 2020-10-13 ASSESSMENT — PAIN SCALES - GENERAL: PAINLEVEL_OUTOF10: 0

## 2020-10-13 NOTE — PROGRESS NOTES
Pt c/o right thigh pain. Seems restless and S.O. states he \"can't get comfortable\". Dr. Kena Orta paged.

## 2020-10-13 NOTE — PROGRESS NOTES
Subjective:   Altered mental status  Still with some confusion and some dysarthria no weakness in the arms or the legs more than his usual  ROS  Fever no chills no GI/ complaints no cardiac pulmonary complaints no TIA no bleeding no headache no sore throat no skin lesions no polyuria no polydipsia  physical exam  General Appearance: in no acute distress and alert  Skin: warm and dry, no rash or erythema  Head: normocephalic and atraumatic  Eyes: pupils equal, round, and reactive to light, conjunctivae normal and sclera anicteric  Neck: neck supple and non tender without mass   Pulmonary/Chest: clear to auscultation bilaterally- no wheezes, rales or rhonchi, normal air movement, no respiratory distress  Cardiovascular: normal rate, regular rhythm, normal S1 and S2, no gallops, intact distal pulses and no carotid bruits  Abdomen: soft, non-tender, non-distended, normal bowel sounds, no masses or organomegaly  Extremities: no edema and pulses no Homans  Neurologic: Awake alert x1 no focal deficit    BP (!) 182/79   Pulse 85   Temp 97.7 °F (36.5 °C) (Oral)   Resp 22   Ht 5' 9\" (1.753 m)   Wt 120 lb (54.4 kg)   SpO2 100%   BMI 17.72 kg/m²     CBC:   Lab Results   Component Value Date    WBC 12.2 10/13/2020    RBC 4.71 10/13/2020    HGB 14.4 10/13/2020    HCT 42.8 10/13/2020    MCV 90.9 10/13/2020    MCH 30.6 10/13/2020    MCHC 33.6 10/13/2020    RDW 12.9 10/13/2020     10/13/2020    MPV 9.0 10/13/2020     BMP:    Lab Results   Component Value Date     10/13/2020    K 3.5 10/13/2020     10/13/2020    CO2 23 10/13/2020    BUN 38 10/13/2020    LABALBU 4.6 10/13/2020    CREATININE 1.06 10/13/2020    CALCIUM 9.5 10/13/2020    GFRAA >60 10/13/2020    LABGLOM >60 10/13/2020    GLUCOSE 138 10/13/2020        Assessment:  Patient Active Problem List   Diagnosis    Altered mental status    Metabolic encephalopathy    Falling episodes    Type 2 diabetes mellitus with diabetic neuropathy, with long-term

## 2020-10-13 NOTE — PROGRESS NOTES
Physical Therapy    Facility/Department: [facility] PROGRESSIVE CARE  Initial Assessment    NAME: Abhijit Pineda  : 1950  MRN: [id number]    Date of Service: 10/13/2020    Discharge Recommendations:  Subacute/Skilled Nursing Facility, Continue to assess pending progress      Abhijit Pineda is a 69 y.o. male who presents to the emergency department private vehicle for evaluation of nausea vomiting. The patient has been having these episodes of syncope. Was to be having an appointment at the [facility]. His significant other states that for the last 36 hours he has had nausea and  vomiting. She brought him to the emergency room today because she states that for the last 12 hours he has been very confused. 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 She states that for the last 12 hours he has been talking off the wall. She noticed it last night but the confusion has got progressively worse today. He is not experiencing any abdominal pain    RN reports patient is medically stable for therapy treatment this date. Chart reviewed prior to treatment and patient is agreeable for therapy. All lines intact and patient positioned comfortably at end of treatment. All patient needs addressed prior to ending therapy session. Assessment   Body structures, Functions, Activity limitations: Decreased endurance;Decreased strength;Decreased balance;Decreased safe awareness; Increased pain  Assessment: Pt tolerated PT session well, limited by cognitive status and pain this date. Recommend SNF this date due to safety concerns, and to improve strength, balance, and functional mobility. Will continue to assess.   Prognosis: Good  Decision Making: Medium Complexity  Clinical Presentation: evolving  PT Education: Transfer Training;Equipment;PT Role;Functional Mobility Training;Plan of Care;General Safety;Gait Training  REQUIRES PT FOLLOW UP: Yes  Activity Tolerance  Activity Tolerance: Patient limited by cognitive status; Patient Tolerated treatment well;Patient limited by fatigue       Patient Diagnosis(es): The primary encounter diagnosis was Altered mental status, unspecified altered mental status type. A diagnosis of Nausea and vomiting, intractability of vomiting not specified, unspecified vomiting type was also pertinent to this visit. has a past medical history of CAD (coronary artery disease), Diabetes mellitus ([address] Headache, Hyperlipidemia, Hypertension, and Kidney stone. has a past surgical history that includes Cardiac surgery and Cholecystectomy. Restrictions  Restrictions/Precautions  Restrictions/Precautions: Fall Risk, Up as Tolerated  Position Activity Restriction  Other position/activity restrictions: IV RUE, telemetry, alarms, up with assist  Vision/Hearing  Vision: Within Functional Limits  Vision Exceptions: Wears glasses at all times  Hearing: Within functional limits     Subjective  General  Chart Reviewed: Yes  Patient assessed for rehabilitation services?: Yes  Response To Previous Treatment: Not applicable  Family / Caregiver Present: No  Follows Commands: Within Functional Limits  Subjective  Subjective: pain in right upper thigh  Pain Screening  Patient Currently in Pain: No          Orientation  Orientation  Overall Orientation Status: Impaired  Orientation Level: Oriented to place; Disoriented to time;Oriented to person;Oriented to situation  Social/Functional History  Social/Functional History  Lives With: Significant other(Felicita)  Type of Home: House(Condo)  Home Layout: Two level, Able to Live on Main level with bedroom/bathroom  Home Access: Level entry  Bathroom Shower/Tub: Walk-in shower  Bathroom Toilet: Standard  Bathroom Equipment: Grab bars in shower  Home Equipment: Quad cane, Rolling walker  Receives Help From: Friend(s)  ADL Assistance: Independent  Homemaking Assistance: Independent  Ambulation Assistance: Independent  Transfer Assistance: Independent  Active : Yes  Mode of Transportation: Car  Occupation: Full time employment  Type of occupation: Physician  Leisure & Hobbies: watching tv with all this COVID stuff  Additional Comments: had recent falls due to off balance amd getting lightheaded  Cognition   Cognition  Overall Cognitive Status: Exceptions  Arousal/Alertness: Appropriate responses to stimuli  Following Commands: Follows all commands without difficulty  Attention Span: Appears intact  Memory: Decreased short term memory  Safety Judgement: Decreased awareness of need for assistance;Decreased awareness of need for safety  Problem Solving: Assistance required to generate solutions;Assistance required to identify errors made;Decreased awareness of errors  Insights: Decreased awareness of deficits  Initiation: Requires cues for some  Sequencing: Requires cues for some    Objective     Observation/Palpation  Posture: Good  Observation: Pt lying in bed, with legs off. Repositioned pt before subjective portion taken  Edema: none    AROM RLE (degrees)  RLE AROM: WFL  RLE General AROM: cannot fully extend knee  AROM LLE (degrees)  LLE AROM : WFL  LLE General AROM: cannot fully extend knee  Strength RLE  Comment: grossly 4/5 MMT  Strength LLE  Comment: grossly 4/5 MMT  Tone RLE  RLE Tone: Normotonic  Tone LLE  LLE Tone: Normotonic  Sensation  Overall Sensation Status: (chronic neuropathy through feet and hands)  Bed mobility  Supine to Sit: Stand by assistance  Sit to Supine: Stand by assistance  Scooting: Stand by assistance  Comment: HOB elevated, good use of handrails and increased time to complete. Transfers  Sit to Stand: Contact guard assistance  Stand to sit: Contact guard assistance  Comment: Verbal cueing for proper hand placement.   Ambulation  Ambulation?: Yes  Ambulation 1  Surface: level tile  Device: Rolling Walker  Assistance: Minimal assistance  Gait Deviations: Decreased step length;Decreased step height;Deviated path;Staggers  Distance: 20ft  Comments: Pt demo'd unsteadiness with gait requiring max cues for use of RW. Pt able to self correct, but shakey on feet during ambulation. Balance  Posture: Good  Sitting - Static: Good  Sitting - Dynamic: Good  Standing - Static: Fair  Standing - Dynamic: Fair;-(BUE support from RW)  Exercises  Comments: demo'd good understanding of ankle pumps     Plan   Plan  Times per week: 1-2x day / 5-6 days per week  Current Treatment Recommendations: Strengthening, Transfer Training, Endurance Training, Balance Training, Gait Training, Home Exercise Program, Functional Mobility Training, Safety Education & Training  Safety Devices  Type of devices: All fall risk precautions in place, Bed alarm in place, Call light within reach, Gait belt, Nurse notified, Left in bed, Patient at risk for falls      OutComes Score  AM-PAC Score  AM-PAC Inpatient Mobility Raw Score : 19 (10/13/20 1525)  AM-PAC Inpatient T-Scale Score : 45.44 (10/13/20 1525)  Mobility Inpatient CMS 0-100% Score: 41.77 (10/13/20 1525)  Mobility Inpatient CMS G-Code Modifier : CK (10/13/20 1525)          Goals  Short term goals  Time Frame for Short term goals: 12 visits  Short term goal 1: Pt will perform bed mobility indep  Short term goal 2: Pt will transfer indep  Short term goal 3: Pt will ambulate 150ft with LRAD and SBA  Short term goal 4: Pt will tolerate 25mins of PT including therex, theract, and gait training to improve functional mobility and activity tolerance. Patient Goals   Patient goals :  To go home       Therapy Time   Individual Concurrent Group Co-treatment   Time In 7818         Time Out 1516         Minutes 29          Tx time: 17mins       Casimer Falling, PT

## 2020-10-13 NOTE — CONSULTS
10/13/20 1533    insulin lispro (HUMALOG) injection vial 0-6 Units  0-6 Units Subcutaneous TID WC Rajni Patrick MD        insulin lispro (HUMALOG) injection vial 0-3 Units  0-3 Units Subcutaneous Nightly Rajni Patrick MD        losartan (COZAAR) tablet 100 mg  100 mg Oral Daily Rajni Patrick MD   100 mg at 10/13/20 1109    And    amLODIPine (NORVASC) tablet 5 mg  5 mg Oral Daily Rajni Patrick MD   5 mg at 10/13/20 1110    And    hydroCHLOROthiazide (HYDRODIURIL) tablet 25 mg  25 mg Oral Daily Rajni Patrick MD   25 mg at 10/13/20 1110    potassium chloride (KLOR-CON M) extended release tablet 20 mEq  20 mEq Oral Once Rajin Patrick MD        metoprolol tartrate (LOPRESSOR) tablet 25 mg  25 mg Oral BID Rajni Patrick MD        glucose (GLUTOSE) 40 % oral gel 15 g  15 g Oral PRN Rajni Patrick MD        dextrose 50 % IV solution  12.5 g Intravenous PRN Rajni Patrick MD        glucagon (rDNA) injection 1 mg  1 mg Intramuscular PRN Rajni Patrick MD        dextrose 5 % solution  100 mL/hr Intravenous PRN Rajni Patrick MD        LORazepam (ATIVAN) tablet 0.5 mg  0.5 mg Oral BID PRN Rajni Patrick MD        0.9 % sodium chloride infusion   Intravenous Continuous ANDRADE Somers -  mL/hr at 10/12/20 2206      amitriptyline (ELAVIL) tablet 50 mg  50 mg Oral BID Rajni Patrick MD   50 mg at 10/13/20 1110    clopidogrel (PLAVIX) tablet 75 mg  75 mg Oral Daily Rajni Patrick MD   75 mg at 10/13/20 1109    levothyroxine (SYNTHROID) tablet 25 mcg  25 mcg Oral QAM AC Rajni Patrick MD        pregabalin (LYRICA) capsule 25 mg  25 mg Oral Daily Rajni Patrick MD   25 mg at 10/13/20 1110    sodium chloride flush 0.9 % injection 10 mL  10 mL Intravenous 2 times per day Rajni Patrick MD        sodium chloride flush 0.9 % injection 10 mL  10 mL Intravenous PRN Rajni Patrick MD        acetaminophen (TYLENOL) tablet 650 mg  650 mg Oral Q6H PRN Rajni Patrick MD Or    acetaminophen (TYLENOL) suppository 650 mg  650 mg Rectal Q6H PRN Brian Thibodeaux MD        polyethylene glycol (GLYCOLAX) packet 17 g  17 g Oral Daily PRN Brian Thibodeaux MD        promethazine (PHENERGAN) tablet 12.5 mg  12.5 mg Oral Q6H PRN Brian Thibodeaux MD        Or    ondansetron (ZOFRAN) injection 4 mg  4 mg Intravenous Q6H PRN Brian Thibodeaux MD        famotidine (PEPCID) injection 20 mg  20 mg Intravenous Nightly Brian Thibodeaux MD           Social History:       Social History     Socioeconomic History    Marital status: Single     Spouse name: None    Number of children: None    Years of education: None    Highest education level: None   Occupational History    None   Social Needs    Financial resource strain: None    Food insecurity     Worry: None     Inability: None    Transportation needs     Medical: None     Non-medical: None   Tobacco Use    Smoking status: Never Smoker    Smokeless tobacco: Never Used   Substance and Sexual Activity    Alcohol use: Yes     Comment: daily    Drug use: Never    Sexual activity: None   Lifestyle    Physical activity     Days per week: None     Minutes per session: None    Stress: None   Relationships    Social connections     Talks on phone: None     Gets together: None     Attends Spiritism service: None     Active member of club or organization: None     Attends meetings of clubs or organizations: None     Relationship status: None    Intimate partner violence     Fear of current or ex partner: None     Emotionally abused: None     Physically abused: None     Forced sexual activity: None   Other Topics Concern    None   Social History Narrative    None         Family Histroy:                Family History   Problem Relation Age of Onset    Mental Illness Mother     Heart Attack Father     Coronary Art Dis Father     Cancer Sister        Review of Systems:   · Constitutional: No Fevers/Chills, No recent weight gain weight loss, No fatigue. · Eyes: No visual changes or diplopia. · ENT: No Headaches, hearing loss or vertigo. · Cardiovascular: Per HPI  · Respiratory: No cough or wheezing, no sputum production. No hematemesis. · Gastrointestinal: No Nausea, Vomiting, Diarrhea, or Constipation  · Genitourinary: No dysuria,hematuria. · Musculoskeletal:  No gait disturbance, weakness or joint complaints. · Integumentary: No rash or pruritis. ·      Physical Exam   Vital Signs: BP (!) 182/79   Pulse 85   Temp 97.7 °F (36.5 °C) (Oral)   Resp 22   Ht 5' 9\" (1.753 m)   Wt 120 lb (54.4 kg)   SpO2 100%   BMI 17.72 kg/m²        Admission Weight: 120 lb (54.4 kg)     General appearance: Alert oriented and cooperative. In no acute distress    Skin:  Warm and Dry to touch    Head: Normocephalic, without obvious abnormality, atraumatic    Eyes: Conjunctivae unremarkable, EOM's intact. Neck: No JVD, No carotid bruit. Neck supple, trachea midline    Lungs: Clear to ausculation bilaterally, no use of accessory muscles. Heart: s1, s2,     Abdomen: Soft, non-tender. Bowel sounds normal.    Extremities: No edema        Pertinent Testing:     Cardiac Cath:      ECHO/ANEESH:      STRESS:    EKG:      CXR:      Labs:      CBC:   Recent Labs     10/12/20  1119 10/13/20  0324   WBC 14.1* 12.2*   HGB 16.1 14.4   HCT 48.7 42.8   MCV 90.7 90.9    286     BMP:   Recent Labs     10/12/20  1130 10/13/20  0324    140   K 4.6 3.5*    103   CO2 22 23   BUN 46* 38*   CREATININE 1.46* 1.06     PT/INR: No results for input(s): PROTIME, INR in the last 72 hours. APTT: No results for input(s): APTT in the last 72 hours. MAG:   Recent Labs     10/13/20  0324   MG 2.2     D Dimer: No results for input(s): DDIMER in the last 72 hours.   Troponin T   Recent Labs     10/12/20  2200 10/13/20  0036 10/13/20  0324   TROPONINT NOT REPORTED NOT REPORTED NOT REPORTED     ProBNP No results for input(s): BNP in the last 72

## 2020-10-13 NOTE — CARE COORDINATION
Case Management Initial Discharge Plan  Mateusz Veraow,         Readmission Risk              Risk of Unplanned Readmission:        10             Met with:patient to discuss discharge plans. Information verified: address, contacts, phone number, , insurance Yes  PCP: Ranjan Morgan MD  Date of last visit: 2 weeks ago    Insurance Provider: Medicare    Discharge Planning  Current Residence:  Private residence   Living Arrangements:    Significant other   Home has 2 stories/ 1 flight stairs to climb (patient reports using the main level only)  Support Systems:   family  Current Services PTA:  None  Agency:    Patient able to perform ADL's:Independent  DME in home:  Shower chair   DME used to aid ambulation prior to admission:      DME used during admission:       Potential Assistance Needed:       Pharmacy: [facility]    Potential Assistance Purchasing Medications:     Does patient want to participate in local refill/ meds to beds program?  No    Patient agreeable to home care: No  [place] of choice provided:  n/a      Type of Home Care Services:     Patient expects to be discharged to:       Prior SNF/Rehab Placement and Facility: none  Agreeable to SNF/Rehab: No  [place] of choice provided: n/a   Evaluation: yes    Expected Discharge date:  10/15/20  Follow Up Appointment: Best Day/ Time:      Transportation provider: self and family   Transportation arrangements needed for discharge: No    Discharge Plan:      SW met with patient and Felicita significant other regarding discharge plan. Patient only use main level of home which includes bedroom and bathroom. Patient is planning to return home at discharge, no needs identified.        Electronically signed by KARIN Love, YAZMIN on 10/13/20 at 11:03 AM EDT

## 2020-10-13 NOTE — PROGRESS NOTES
Speech Language Pathology  Facility/Department: [facility]   CLINICAL BEDSIDE SWALLOW EVALUATION    NAME: Zully Ortiz  : 1950  MRN: [id number]    ADMISSION DATE: 10/12/2020  ADMITTING DIAGNOSIS: has Altered mental status on their problem list.        Date of Eval: 10/13/2020  Evaluating Therapist: Mary Hernández    Current Diet level:  Current Diet : NPO  Current Liquid Diet : NPO      Primary Complaint: Zully Ortiz is a 69 y.o. male who presents to the emergency department private vehicle for evaluation of nausea vomiting. The patient has been having these episodes of syncope. Was to be having an appointment at the [facility]. His significant other states that for the last 36 hours he has had nausea and  vomiting. She brought him to the emergency room today because she states that for the last 12 hours he has been very confused. 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 She states that for the last 12 hours he has been talking off the wall. She noticed it last night but the confusion has got progressively worse today. He is not experiencing any abdominal pain       Pain:  Pain Assessment  Pain Level: 0    Reason for Referral  Zully Ortiz was referred for a bedside swallow evaluation to assess the efficiency of his swallow function, identify signs and symptoms of aspiration and make recommendations regarding safe dietary consistencies, effective compensatory strategies, and safe eating environment. Impression: Patient presents with probable safe swallow for Regular diet with thin liquids as evidenced by no overt s/s of aspiration noted with consistencies tested. Recommend small sips and bites, only feed when alert and awake and upright at 90 degrees for all PO intake. Recommend close monitoring for overt/clinical s/s of aspiration and D/C PO intake and complete Modified Barium Swallow Study should they occur.   Results and recommendations reported to RN. Dysphagia Outcome Severity Scale: Level 7: Normal in all situations     Treatment Plan  Requires SLP Intervention: Yes  Duration/Frequency of Treatment: 2-3X/week  D/C Recommendations: 24 hour supervision/assistance       Recommended Diet and Intervention  Diet Solids Recommendation: Regular  Liquid Consistency Recommendation: Thin  Recommended Form of Meds: PO     Therapeutic Interventions: Diet tolerance monitoring    Compensatory Swallowing Strategies  Compensatory Swallowing Strategies: Alternate solids and liquids;Eat/Feed slowly;Upright as possible for all oral intake;Small bites/sips    Treatment/Goals  Dysphagia Goals: The patient will tolerate recommended diet without observed clinical signs of aspiration    General  Chart Reviewed: Yes  Behavior/Cognition: Alert; Cooperative  Respiratory Status: Room air  Communication Observation: Functional  Follows Directions: Simple  Dentition: Adequate  Patient Positioning: Upright in bed  Baseline Vocal Quality: Normal  Volitional Cough: Strong  Consistencies Administered: Reg solid; Dysphagia Pureed (Dysphagia I); Thin - straw; Thin - cup           Vision/Hearing  Vision  Vision: Within Functional Limits  Hearing  Hearing: Within functional limits    Oral Motor Deficits  Oral/Motor  Oral Motor: Within functional limits    Oral Phase Dysfunction  Oral Phase  Oral Phase: WFL  Oral Phase  Oral Phase - Comment: A-P transit and oral manipualtion fucntional for consistencies tested. Pt needed max encouragment to take trials, did not like water, orange juice, pudding or crackers.      Indicators of Pharyngeal Phase Dysfunction   Pharyngeal Phase  Pharyngeal Phase: WFL  Pharyngeal Phase   Pharyngeal: Thin, Puree, Cracker: No overt s/s of aspiration noted for consistencies tested    Prognosis  Prognosis  Prognosis for safe diet advancement: good  Individuals consulted  Consulted and agree with results and recommendations: Patient    Education  Patient Education: yes  Patient Education Response: Verbalizes understanding             Therapy Time  SLP Individual Minutes  Time In: 3915  Time Out: 8884  Minutes: 7            RAUL Gonzales  10/13/2020 1:37 PM

## 2020-10-13 NOTE — CONSULTS
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 This has been accompanied  by confusion gait imbalannce frequent falling along with of slurring of speech . 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 He will use lyrica 25 mg po qAM , 100 mg po qhs for neuropathic pain with percocet PRN . Up to one week ago for 2 weeks his wife describes he had greater imbalance on his feet with postural lightheadedness falling 3 times per day according to wife . This weekend he developed nausea , vomiting and diarrhea accompanied by confusion and disorientation . He is improved with still mild disorientation . He does report to have diabetic gastroparesis with nausea and vomiting in the past . There is no weakness, numbness , bulbar or visual complaint . There is history of syncope. Head CT and MRI of Head with mild atrophy . Toxicology screen is positive for opiates , oxycodone and THC . Significant medications lyrica 25 mg po qd and 100 mg po qhs  , plavix 75 mg po qd , elavil 50 mg po qhs , percocet PRN .  Testing Head CT and MRI of Head with mild atrophy       Past Medical History:   Diagnosis Date    CAD (coronary artery disease)     Diabetes mellitus ([address]     Headache     Hyperlipidemia     Hypertension     Kidney stone        Past Surgical History:   Procedure Laterality Date    CARDIAC SURGERY      CHOLECYSTECTOMY         Family History   Problem Relation Age of Onset    Mental Illness Mother     Heart Attack Father     Coronary Art Dis Father     Cancer Sister        Social History     Socioeconomic History    Marital status: Single     Spouse name: None    Number of children: None    Years of education: None    Highest education level: None   Occupational History    None   Social Needs    15 g  15 g Oral PRN Uriel Castellanos MD        dextrose 50 % IV solution  12.5 g Intravenous PRN Uriel Castellanos MD        glucagon (rDNA) injection 1 mg  1 mg Intramuscular PRN Uriel Castellanos MD        dextrose 5 % solution  100 mL/hr Intravenous PRN Uriel Castellanos MD        0.9 % sodium chloride infusion   Intravenous Continuous Arana Michel APRN -  mL/hr at 10/12/20 2206      amitriptyline (ELAVIL) tablet 50 mg  50 mg Oral BID Uriel Castellanos MD   50 mg at 10/13/20 1110    clopidogrel (PLAVIX) tablet 75 mg  75 mg Oral Daily Uriel Castellanos MD   75 mg at 10/13/20 1109    levothyroxine (SYNTHROID) tablet 25 mcg  25 mcg Oral QAM AC Uriel Castellanos MD        pregabalin (LYRICA) capsule 25 mg  25 mg Oral Daily Uriel Castellanos MD   25 mg at 10/13/20 1110    sodium chloride flush 0.9 % injection 10 mL  10 mL Intravenous 2 times per day Uriel Castellanos MD        sodium chloride flush 0.9 % injection 10 mL  10 mL Intravenous PRN Uriel Castellanos MD        acetaminophen (TYLENOL) tablet 650 mg  650 mg Oral Q6H PRN Uriel Castellanos MD        Or   Niki Deandre acetaminophen (TYLENOL) suppository 650 mg  650 mg Rectal Q6H PRN Uriel Castellanos MD        polyethylene glycol (GLYCOLAX) packet 17 g  17 g Oral Daily PRN Uriel Castellanos MD        promethazine (PHENERGAN) tablet 12.5 mg  12.5 mg Oral Q6H PRN Uriel Castellanos MD        Or    ondansetron (ZOFRAN) injection 4 mg  4 mg Intravenous Q6H PRN Uriel Castellanos MD        famotidine (PEPCID) injection 20 mg  20 mg Intravenous Nightly Uriel Castellanos MD           No Known Allergies    ROS:   Constitutional                  Negative for fever and chills   HEENT                            Negative for ear discharge, ear pain, nosebleed  Eyes                                Negative for photophobia, pain and discharge  Respiratory                      Negative for hemoptysis and sputum  Cardiovascular                Negative for orthopnea, claudication and Falling episodes . Postural lightheadedness . Rule out orthostatic hypotension . Rule out cervical myelopathic process . Diabetic polyneuropthy with left left leg diabetic amyotrophy     Plan:    MRI cervical spine  Carotid US , cardiac 2 D echo  Orhostatic blood pressures . PT/OT .  EEG

## 2020-10-13 NOTE — PROGRESS NOTES
Speech Language Pathology  Facility/Department: [facility]  Initial Speech/Language/Cognitive Assessment    NAME: Osvaldo Quiñones  : 1950   MRN: [id number]  ADMISSION DATE: 10/12/2020  ADMITTING DIAGNOSIS: has Altered mental status on their problem list.      Date of Eval: 10/13/2020   Evaluating Therapist: RAUL Robertson    RECENT RESULTS  CT OF HEAD/MRI:   Impression    1. No gross acute intracranial abnormality.  Specifically, no acute    infarction. 2. Mild age-appropriate diffuse brain parenchymal volume loss. Primary Complaint: Osvaldo Quiñones is a 69 y.o. male who presents to the emergency department private vehicle for evaluation of nausea vomiting. The patient has been having these episodes of syncope. Was to be having an appointment at the [facility]. His significant other states that for the last 36 hours he has had nausea and  vomiting. She brought him to the emergency room today because she states that for the last 12 hours he has been very confused. 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 She states that for the last 12 hours he has been talking off the wall. She noticed it last night but the confusion has got progressively worse today. He is not experiencing any abdominal pain    Pain:  Pain Assessment  Pain Level: 0    Assessment:    Pt presents with moderate-severe cognitive deficits characterized by impaired recall, orientation, reasoning, problems solving and verbal organization. Pt.Responses would at times seem paraphasic in nature. Pt. Presents with no dysarthria, no O/M deficits at this time. ST to follow up and provide treatment to address noted deficits. Education provided. Further therapy recommended at discharge.       Recommendations:  Requires SLP Intervention: Yes  Duration/Frequency of Treatment: 3-5X/week  D/C Recommendations: Inpatient rehabilitation       Plan:   Goals:  Short-term Goals  Goal 1: Recall orientation information with 90% accuracy  Goal 2: Recall 3-5 units with and without distraction with 90% accuracy  Goal 3: Complete verbal  reasoning tasks with 90% accuracy  Goal 4: Complete divergent thinking tasks with 90% accuracy  Goal 5: Further assess as able   Patient/family involved in developing goals and treatment plan: yes  Subjective:   Previous level of function and limitations: independent, retired physician    General  Chart Reviewed: Yes  Social/Functional History  Lives With: Significant other  Type of Home: House  Vision  Vision: Within Functional Limits  Hearing  Hearing: Within functional limits           Objective:     Oral/Motor  Oral Motor: Within functional limits      Motor Speech  Motor Speech: Within Functional Limits         Cognition:      Orientation  Overall Orientation Status: Impaired(Oriented to age and , not to year, city, hospital, month)  Attention  Attention: Exceptions to [facility]  Sustained Attention: Moderate  Memory  Memory: Exceptions to WFL([place] recall 3/3,0/5)  Short-term Memory: Moderate(1/3,1/3)  Problem Solving  Problem Solving: Exceptions to [facility]  Verbal Reasoning Skills: Severe(Antonyms 0/4, Word deductions 1/4, Inductive reasoning: pt. replied LYNN)  Abstract Reasoning  Abstract Reasoning: Exceptions to [facility]  Convergent Thinking: To be assessed in therapy(replied LYNN)  Divergent Thinking:  Moderate(3/30 seconds)  Safety/Judgement  Safety/Judgement: Exceptions to WFL  Insight: Severe  (1/3)  Flexibility of Thought: Severe(1/3)    Prognosis:  Speech Therapy Prognosis  Prognosis: Fair  Individuals consulted  Consulted and agree with results and recommendations: Patient    Education:  Patient Education: yes  Patient Education Response: Verbalizes understanding          Therapy Time:   Individual Concurrent Group Co-treatment   Time In [phone]         Time Out 8217         Minutes RAUL Montes  10/13/2020 1:48 PM

## 2020-10-14 ENCOUNTER — APPOINTMENT (OUTPATIENT)
Dept: CT IMAGING | Age: 70
DRG: 917 | End: 2020-10-14
Payer: MEDICARE

## 2020-10-14 ENCOUNTER — APPOINTMENT (OUTPATIENT)
Dept: MRI IMAGING | Age: 70
DRG: 917 | End: 2020-10-14
Payer: MEDICARE

## 2020-10-14 PROBLEM — E43 SEVERE MALNUTRITION (HCC): Status: ACTIVE | Noted: 2020-10-14

## 2020-10-14 LAB
ABSOLUTE EOS #: <0.03 K/UL (ref 0–0.44)
ABSOLUTE IMMATURE GRANULOCYTE: 0.09 K/UL (ref 0–0.3)
ABSOLUTE LYMPH #: 0.98 K/UL (ref 1.1–3.7)
ABSOLUTE MONO #: 0.65 K/UL (ref 0.1–1.2)
ALBUMIN (CALCULATED): 4.9 G/DL (ref 3.2–5.2)
ALBUMIN PERCENT: 72 % (ref 45–65)
ALBUMIN SERPL-MCNC: 4.5 G/DL (ref 3.5–5.2)
ALBUMIN/GLOBULIN RATIO: ABNORMAL (ref 1–2.5)
ALP BLD-CCNC: 67 U/L (ref 40–129)
ALPHA 1 PERCENT: 2 % (ref 3–6)
ALPHA 2 PERCENT: 9 % (ref 6–13)
ALPHA-1-GLOBULIN: 0.1 G/DL (ref 0.1–0.4)
ALPHA-2-GLOBULIN: 0.6 G/DL (ref 0.5–0.9)
ALT SERPL-CCNC: 11 U/L (ref 5–41)
ANION GAP SERPL CALCULATED.3IONS-SCNC: 14 MMOL/L (ref 9–17)
AST SERPL-CCNC: 19 U/L
BASOPHILS # BLD: 0 % (ref 0–2)
BASOPHILS ABSOLUTE: 0.04 K/UL (ref 0–0.2)
BETA GLOBULIN: 0.6 G/DL (ref 0.5–1.1)
BETA PERCENT: 9 % (ref 11–19)
BILIRUB SERPL-MCNC: 0.89 MG/DL (ref 0.3–1.2)
BUN BLDV-MCNC: 33 MG/DL (ref 8–23)
BUN/CREAT BLD: 33 (ref 9–20)
C-REACTIVE PROTEIN: 3.8 MG/L (ref 0–5)
CALCIUM SERPL-MCNC: 9.6 MG/DL (ref 8.6–10.4)
CHLORIDE BLD-SCNC: 105 MMOL/L (ref 98–107)
CO2: 20 MMOL/L (ref 20–31)
CREAT SERPL-MCNC: 0.99 MG/DL (ref 0.7–1.2)
DIFFERENTIAL TYPE: ABNORMAL
EOSINOPHILS RELATIVE PERCENT: 0 % (ref 1–4)
GAMMA GLOBULIN %: 7 % (ref 9–20)
GAMMA GLOBULIN: 0.5 G/DL (ref 0.5–1.5)
GFR AFRICAN AMERICAN: >60 ML/MIN
GFR NON-AFRICAN AMERICAN: >60 ML/MIN
GFR SERPL CREATININE-BSD FRML MDRD: ABNORMAL ML/MIN/{1.73_M2}
GFR SERPL CREATININE-BSD FRML MDRD: ABNORMAL ML/MIN/{1.73_M2}
GLUCOSE BLD-MCNC: 102 MG/DL (ref 75–110)
GLUCOSE BLD-MCNC: 108 MG/DL (ref 75–110)
GLUCOSE BLD-MCNC: 121 MG/DL (ref 70–99)
GLUCOSE BLD-MCNC: 177 MG/DL (ref 75–110)
HCT VFR BLD CALC: 43.7 % (ref 40.7–50.3)
HEMOGLOBIN: 14.4 G/DL (ref 13–17)
IMMATURE GRANULOCYTES: 1 %
LYMPHOCYTES # BLD: 7 % (ref 24–43)
MAGNESIUM: 2.1 MG/DL (ref 1.6–2.6)
MCH RBC QN AUTO: 29.9 PG (ref 25.2–33.5)
MCHC RBC AUTO-ENTMCNC: 33 G/DL (ref 28.4–34.8)
MCV RBC AUTO: 90.7 FL (ref 82.6–102.9)
MONOCYTES # BLD: 5 % (ref 3–12)
NRBC AUTOMATED: 0 PER 100 WBC
P E INTERPRETATION, U: NORMAL
PATHOLOGIST: ABNORMAL
PATHOLOGIST: NORMAL
PATHOLOGIST: NORMAL
PDW BLD-RTO: 13.1 % (ref 11.8–14.4)
PLATELET # BLD: 297 K/UL (ref 138–453)
PLATELET ESTIMATE: ABNORMAL
PMV BLD AUTO: 9 FL (ref 8.1–13.5)
POTASSIUM SERPL-SCNC: 3.2 MMOL/L (ref 3.7–5.3)
PROCALCITONIN: 0.09 NG/ML
PROTEIN ELECTROPHORESIS, SERUM: ABNORMAL
RBC # BLD: 4.82 M/UL (ref 4.21–5.77)
RBC # BLD: ABNORMAL 10*6/UL
SEDIMENTATION RATE, ERYTHROCYTE: 4 MM (ref 0–20)
SEG NEUTROPHILS: 87 % (ref 36–65)
SEGMENTED NEUTROPHILS ABSOLUTE COUNT: 12.06 K/UL (ref 1.5–8.1)
SERUM IFX INTERP: NORMAL
SODIUM BLD-SCNC: 139 MMOL/L (ref 135–144)
SPECIMEN TYPE: NORMAL
TOTAL PROT. SUM,%: 99 % (ref 98–102)
TOTAL PROT. SUM: 6.7 G/DL (ref 6.3–8.2)
TOTAL PROTEIN: 6.8 G/DL (ref 6.4–8.3)
TOTAL PROTEIN: 6.9 G/DL (ref 6.4–8.3)
URINE TOTAL PROTEIN: 123 MG/DL
WBC # BLD: 13.8 K/UL (ref 3.5–11.3)
WBC # BLD: ABNORMAL 10*3/UL

## 2020-10-14 PROCEDURE — 72141 MRI NECK SPINE W/O DYE: CPT

## 2020-10-14 PROCEDURE — 95816 EEG AWAKE AND DROWSY: CPT | Performed by: PSYCHIATRY & NEUROLOGY

## 2020-10-14 PROCEDURE — 95816 EEG AWAKE AND DROWSY: CPT

## 2020-10-14 PROCEDURE — 6370000000 HC RX 637 (ALT 250 FOR IP): Performed by: INTERNAL MEDICINE

## 2020-10-14 PROCEDURE — 71260 CT THORAX DX C+: CPT

## 2020-10-14 PROCEDURE — 2060000000 HC ICU INTERMEDIATE R&B

## 2020-10-14 PROCEDURE — 6360000002 HC RX W HCPCS: Performed by: INTERNAL MEDICINE

## 2020-10-14 PROCEDURE — 2580000003 HC RX 258: Performed by: PSYCHIATRY & NEUROLOGY

## 2020-10-14 PROCEDURE — 85025 COMPLETE CBC W/AUTO DIFF WBC: CPT

## 2020-10-14 PROCEDURE — 83735 ASSAY OF MAGNESIUM: CPT

## 2020-10-14 PROCEDURE — 85652 RBC SED RATE AUTOMATED: CPT

## 2020-10-14 PROCEDURE — 80053 COMPREHEN METABOLIC PANEL: CPT

## 2020-10-14 PROCEDURE — 99233 SBSQ HOSP IP/OBS HIGH 50: CPT | Performed by: PSYCHIATRY & NEUROLOGY

## 2020-10-14 PROCEDURE — 84145 PROCALCITONIN (PCT): CPT

## 2020-10-14 PROCEDURE — 36415 COLL VENOUS BLD VENIPUNCTURE: CPT

## 2020-10-14 PROCEDURE — 6360000004 HC RX CONTRAST MEDICATION: Performed by: PSYCHIATRY & NEUROLOGY

## 2020-10-14 PROCEDURE — 82947 ASSAY GLUCOSE BLOOD QUANT: CPT

## 2020-10-14 PROCEDURE — 86140 C-REACTIVE PROTEIN: CPT

## 2020-10-14 RX ORDER — SODIUM CHLORIDE 0.9 % (FLUSH) 0.9 %
10 SYRINGE (ML) INJECTION PRN
Status: DISCONTINUED | OUTPATIENT
Start: 2020-10-14 | End: 2020-10-17 | Stop reason: HOSPADM

## 2020-10-14 RX ORDER — OLMESARTAN MEDOXOMIL, AMLODIPINE AND HYDROCHLOROTHIAZIDE TABLET 40/5/25 MG 40; 5; 25 MG/1; MG/1; MG/1
1 TABLET ORAL DAILY
Status: DISCONTINUED | OUTPATIENT
Start: 2020-10-14 | End: 2020-10-15

## 2020-10-14 RX ORDER — FAMOTIDINE 20 MG/1
20 TABLET, FILM COATED ORAL 2 TIMES DAILY
Status: DISCONTINUED | OUTPATIENT
Start: 2020-10-14 | End: 2020-10-17 | Stop reason: HOSPADM

## 2020-10-14 RX ORDER — 0.9 % SODIUM CHLORIDE 0.9 %
80 INTRAVENOUS SOLUTION INTRAVENOUS ONCE
Status: COMPLETED | OUTPATIENT
Start: 2020-10-14 | End: 2020-10-14

## 2020-10-14 RX ORDER — POTASSIUM CHLORIDE 7.45 MG/ML
10 INJECTION INTRAVENOUS
Status: COMPLETED | OUTPATIENT
Start: 2020-10-14 | End: 2020-10-14

## 2020-10-14 RX ADMIN — PREGABALIN 25 MG: 25 CAPSULE ORAL at 11:35

## 2020-10-14 RX ADMIN — POTASSIUM CHLORIDE 10 MEQ: 10 INJECTION, SOLUTION INTRAVENOUS at 19:58

## 2020-10-14 RX ADMIN — SODIUM CHLORIDE 80 ML: 9 INJECTION, SOLUTION INTRAVENOUS at 17:47

## 2020-10-14 RX ADMIN — FAMOTIDINE 20 MG: 20 TABLET ORAL at 20:40

## 2020-10-14 RX ADMIN — METOPROLOL TARTRATE 25 MG: 25 TABLET, FILM COATED ORAL at 20:41

## 2020-10-14 RX ADMIN — LORAZEPAM 0.5 MG: 0.5 TABLET ORAL at 11:35

## 2020-10-14 RX ADMIN — AMITRIPTYLINE HYDROCHLORIDE 50 MG: 50 TABLET, FILM COATED ORAL at 20:40

## 2020-10-14 RX ADMIN — POTASSIUM CHLORIDE 10 MEQ: 10 INJECTION, SOLUTION INTRAVENOUS at 18:10

## 2020-10-14 RX ADMIN — CLOPIDOGREL 75 MG: 75 TABLET, FILM COATED ORAL at 11:33

## 2020-10-14 RX ADMIN — METOPROLOL TARTRATE 25 MG: 25 TABLET, FILM COATED ORAL at 11:33

## 2020-10-14 RX ADMIN — OLMESARTAN MEDOXOMIL, AMLODIPINE AND HYDROCHLOROTHIAZIDE TABLET 40/5/25 MG 1 TABLET: 40; 5; 25 TABLET ORAL at 11:35

## 2020-10-14 RX ADMIN — LEVOTHYROXINE SODIUM 25 MCG: 0.03 TABLET ORAL at 06:10

## 2020-10-14 RX ADMIN — Medication 10 ML: at 17:48

## 2020-10-14 RX ADMIN — AMLODIPINE BESYLATE 5 MG: 5 TABLET ORAL at 06:10

## 2020-10-14 RX ADMIN — IOPAMIDOL 75 ML: 755 INJECTION, SOLUTION INTRAVENOUS at 17:47

## 2020-10-14 RX ADMIN — AMITRIPTYLINE HYDROCHLORIDE 50 MG: 50 TABLET, FILM COATED ORAL at 06:10

## 2020-10-14 NOTE — PROGRESS NOTES
Occupational Therapy  DATE: 10/14/2020    NAME: Leo Badillo  MRN: [id number]   : 1950    Patient not seen this date for Occupational Therapy due to:  [] Blood transfusion in progress  [x] Cancel by RN-10/14 cx this date per RN as pt with alot of testing, increased confusion and pt had difficult conversation with MD; continue to follow  [] Hemodialysis  []  Refusal by Patient   [] Spine Precautions   [] Strict Bedrest  [] Surgery  [] Testing      [] Other        [] PT being discontinued at this time. Patient independent. No further needs. [] PT being discontinued at this time as the patient has been transferred to hospice care. No further needs.     Lesvia Becerra, OT

## 2020-10-14 NOTE — PROGRESS NOTES
Comprehensive Nutrition Assessment    Type and Reason for Visit:  Positive Nutrition Screen(Unplanned weight loss, decreased appetite)    Nutrition Recommendations/Plan:   1. Continue  CARB CONTROL diet  2. Start Glucerna 2x/day  3. Monitor p.o intakes, Glucerna tolerance and labs    Nutrition Assessment:  Patient admission is related to altered mental status for 2 days and 3 days of emesis prior to admission. Patient had increased confusion, falls and slurred speech prior to admission. Patient asleep at time of visit. Patient is status post bedside swallow study (10/13) which recommended regular solids and thin liquids. Patient is underweight. BMI is 17.7 and appears to have moderate fat and muscle mass loss. Continue current diet and start Glucerna 2x/day. Monitor p.o intakes and labs    Malnutrition Assessment:  Malnutrition Status:  Severe malnutrition      Estimated Daily Nutrient Needs:  Energy (kcal):  1608-5180 kcal based on 30-32 kcal/kg; Weight Used for Energy Requirements:  Current     Protein (g):  70-82 gm/kg based on 1.3-1.5 gm/kg; Weight Used for Protein Requirements:  Current          Nutrition Related Findings:  No edema. Bedside swallow study 10/13. Altered mental status      Wounds:  None       Current Nutrition Therapies:    DIET CARB CONTROL;   Dietary Nutrition Supplements: Diabetic Oral Supplement    Anthropometric Measures:  · Height: 5' 9\" (175.3 cm)  · Current Body Weight: 120 lb (54.4 kg)   · Admission Body Weight: 120 lb (54.4 kg)     · Ideal Body Weight: 160 lbs; % Ideal Body Weight 75 %   · BMI: 17.7  · BMI Categories: Underweight (BMI less than 22) age over 72       Nutrition Diagnosis:   · Severe malnutrition related to inadequate protein-energy intake as evidenced by poor intake prior to admission, intake 0-25%, BMI, moderate muscle loss, moderate loss of subcutaneous fat(17.72)      Nutrition Interventions:   Food and/or Nutrient Delivery:  Continue Current Diet, Start Oral Nutrition Supplement  Nutrition Education/Counseling:  Education not indicated   Coordination of Nutrition Care:  Continued Inpatient Monitoring    Goals:  PO intakes are greater than 50% at meals       Nutrition Monitoring and Evaluation:   Food/Nutrient Intake Outcomes:  Food and Nutrient Intake, Supplement Intake  Physical Signs/Symptoms Outcomes:  Weight, Biochemical Data     Discharge Planning:    Continue current diet, Continue Oral Nutrition Supplement           Thony MEDINA, RDN, LDN  Lead Clinical Dietitian  RD Office Phone [phone]

## 2020-10-14 NOTE — PLAN OF CARE
Nutrition Problem #1: Inadequate protein-energy intake  Intervention: Food and/or Nutrient Delivery: Continue Current Diet, Start Oral Nutrition Supplement  Nutritional Goals: PO intakes are greater than 50% at meals

## 2020-10-14 NOTE — PLAN OF CARE
Problem: Falls - Risk of:  Goal: Will remain free from falls  Description: Will remain free from falls  Outcome: Ongoing  Goal: Absence of physical injury  Description: Absence of physical injury  Outcome: Ongoing     Problem: Skin Integrity:  Goal: Will show no infection signs and symptoms  Description: Will show no infection signs and symptoms  Outcome: Ongoing  Goal: Absence of new skin breakdown  Description: Absence of new skin breakdown  Outcome: Ongoing     Problem: Cardiac:  Goal: Ability to maintain vital signs within normal range will improve  Description: Ability to maintain vital signs within normal range will improve  Outcome: Ongoing  Goal: Cardiovascular alteration will improve  Description: Cardiovascular alteration will improve  Outcome: Ongoing

## 2020-10-14 NOTE — CONSULTS
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 His work-up was remarkable for a creatinine of 1.46, baseline creatinine has been 0.9-1.1. His urine dipstick was remarkable for +2 proteinuria  He does have a history of diabetes for about 8 years usually well controlled  Has a history of hypertension the same period of time and it is usually well controlled  He is aware of having some kidney function abnormalities and proteinuria, he reported that couple of years ago he saw Dr. Erasmo Lebron and he was told that his kidneys are not bad and that he was slightly dehydrated  His mental status showed significant improvement, still does not feel that he is back to normal yet but feels that he is able to remember everything well  Blood pressure has been elevated with max blood pressure of 212/86, most recent blood pressure was 145/61    Review Of Systems:   Constitutional: No fever, chills, lethargy, weakness or wt loss. HEENT:  No headache, nasal discharge or sore throat. Cardiac:  No chest pain, dyspnea, orthopnea or PND. Chest:              No cough, phlegm or wheezing. Abdomen:  No abdominal pain, had nausea, and vomiting   Neuro:             No gross focal weakness, numbness, had confusion upon admission. No abnormal movements or seizure like activity. Skin:   No rashes or itching. :   No hematuria, pyuria, dysuria or flank pain. Extremities:  No swelling or joint pains.   Psych:            No depression or anxiety     Past Medical History:   Diagnosis Date    CAD (coronary artery disease)     Diabetes mellitus ([address]     Headache     Hyperlipidemia     Hypertension     Kidney stone        Past Surgical History:   Procedure Laterality Date    CARDIAC SURGERY      CHOLECYSTECTOMY         Prior to Admission medications    Medication Sig Bilateral air entry, clear to auscultation, no wheezing, rhonchi or rales. Cardiovascular: RRR, S1S2, no murmur, rub or gallop. No lower extremity edema. Abdomen: Soft, non tender to palpation. Musculoskeletal: No cyanosis or clubbing. Integumentary: Pink, warm and dry. Free from rash or lesions. CNS: Oriented to person, place and time. Speech clear. Face symmetrical. No tremor. Psych: Answering questions appropriately,.   Mood and affect    Data:  CBC:   Lab Results   Component Value Date    WBC 13.8 (H) 10/14/2020    HGB 14.4 10/14/2020    HCT 43.7 10/14/2020    MCV 90.7 10/14/2020     10/14/2020     BMP:    Lab Results   Component Value Date     10/14/2020     10/13/2020     10/12/2020    K 3.2 (L) 10/14/2020    K 3.5 (L) 10/13/2020    K 4.6 10/12/2020     10/14/2020     10/13/2020     10/12/2020    CO2 20 10/14/2020    CO2 23 10/13/2020    CO2 22 10/12/2020    BUN 33 (H) 10/14/2020    BUN 38 (H) 10/13/2020    BUN 46 (H) 10/12/2020    CREATININE 0.99 10/14/2020    CREATININE 1.06 10/13/2020    CREATININE 1.46 (H) 10/12/2020    GLUCOSE 121 (H) 10/14/2020    GLUCOSE 138 (H) 10/13/2020    GLUCOSE 185 (H) 10/12/2020     CMP:   Lab Results   Component Value Date     10/14/2020    K 3.2 10/14/2020     10/14/2020    CO2 20 10/14/2020    BUN 33 10/14/2020    CREATININE 0.99 10/14/2020    GLUCOSE 121 10/14/2020    CALCIUM 9.6 10/14/2020    PROT 6.9 10/14/2020    LABALBU 4.5 10/14/2020    BILITOT 0.89 10/14/2020    ALKPHOS 67 10/14/2020    AST 19 10/14/2020    ALT 11 10/14/2020      Hepatic:   Lab Results   Component Value Date    AST 19 10/14/2020    AST 15 10/13/2020    AST 11 10/12/2020    ALT 11 10/14/2020    ALT 9 10/13/2020    ALT 8 10/12/2020    BILITOT 0.89 10/14/2020    BILITOT 0.82 10/13/2020    BILITOT 0.73 10/12/2020    ALKPHOS 67 10/14/2020    ALKPHOS 60 10/13/2020    ALKPHOS 65 10/12/2020     BNP: No results found for: BNP  Lipids: No results found for: CHOL, HDL  INR: No results found for: INR  PTH: No results found for: PTH  Phosphorus:  No results found for: PHOS  Ionized Calcium: No results found for: IONCA  Magnesium:   Lab Results   Component Value Date    MG 2.1 10/14/2020     Albumin:   Lab Results   Component Value Date    LABALBU 4.5 10/14/2020     Last 3 CK, CKMB, Troponin: @LABRCNT(CKTOTAL:3,CKMB:3,TROPONINI:3)       URINE:)No results found for: [facility]     Radiology:   Chest x-ray  1. No acute intrathoracic abnormality.  No evidence of intrathoracic    metastatic disease. 2. No acute intra-abdominal abnormality.  Hepatic steatosis. 3. Ill-defined sclerotic lesion in the right sacrum is indeterminate.  MRI    with and without contrast is recommended for further characterization. 4. Prior cholecystectomy.              Renal ultrasound  The right kidney measures 10.4 cm in length and the left kidney measures 10.5    cm in length.         Diffusely increased renal parenchymal echogenicity noted bilaterally,    suggestive of chronic medical renal disease.  No evidence for nephrolithiasis    or hydronephrosis.  There multiple cystic lesions within the upper pole of    the left kidney measuring 0.5 x 0.8 x 0.4 cm and 2.9 x 2.9 x 3.2 cm.               Assessment:  1. Acute kidney injury, caused by volume depletion, function is back to baseline  2. Proteinuria, type factorial secondary to diabetic nephropathy hypertensive nephrosclerosis  3. Mental status changes  4. Hypertension  5. Hypokalemia    Plan:   Will obtain protein creatinine ratio, urine dipstick sometimes overestimates proteinuria if the urine sample is concentrated  Serum protein immunofixation was negative  Continue gentle hydration  Renal ultrasound was suggestive of chronic medical renal disease  Continue losartan and amlodipine  If the patient will continue to be at risk of volume depletion we recommend against using hydrochlorothiazide on the long-term  Potassium

## 2020-10-14 NOTE — PROGRESS NOTES
Subjective:     Follow-up type 2 diabetes  No polyuria or polydipsia or hypoglycemia blood sugars reviewed  ROS  No fever no chills no GI/ complaints no cardiopulmonary complaints no TIA no bleeding no headache no sore throat palpitations no polyuria no complaints of hypoglycemia  physical exam  General Appearance: in no acute distress, alert and disoriented  Skin: warm and dry, no rash or erythema  Head: normocephalic and atraumatic  Eyes: pupils equal, round, and reactive to light, conjunctivae normal and sclera anicteric  Neck: neck supple and non tender without mass   Pulmonary/Chest: clear to auscultation bilaterally- no wheezes, rales or rhonchi, normal air movement, no respiratory distress  Cardiovascular: normal rate, regular rhythm, normal S1 and S2, no gallops, intact distal pulses and no carotid bruits  Abdomen: soft, non-tender, non-distended, normal bowel sounds, no masses or organomegaly  Extremities: no edema and good pulses no Homans' sign  Neurologic: Oriented x2 no focal deficit    BP (!) 145/61   Pulse 73   Temp 98.1 °F (36.7 °C) (Oral)   Resp 20   Ht 5' 9\" (1.753 m)   Wt 120 lb (54.4 kg)   SpO2 98%   BMI 17.72 kg/m²     CBC:   Lab Results   Component Value Date    WBC 13.8 10/14/2020    RBC 4.82 10/14/2020    HGB 14.4 10/14/2020    HCT 43.7 10/14/2020    MCV 90.7 10/14/2020    MCH 29.9 10/14/2020    MCHC 33.0 10/14/2020    RDW 13.1 10/14/2020     10/14/2020    MPV 9.0 10/14/2020     BMP:    Lab Results   Component Value Date     10/14/2020    K 3.2 10/14/2020     10/14/2020    CO2 20 10/14/2020    BUN 33 10/14/2020    LABALBU 4.5 10/14/2020    CREATININE 0.99 10/14/2020    CALCIUM 9.6 10/14/2020    GFRAA >60 10/14/2020    LABGLOM >60 10/14/2020    GLUCOSE 121 10/14/2020        Assessment:  Patient Active Problem List   Diagnosis    Altered mental status    Metabolic encephalopathy    Falling episodes    Type 2 diabetes mellitus with diabetic neuropathy, with long-term current use of insulin (HCC)    Nausea and vomiting    Severe malnutrition (HCC)   Proteinuria  Type 2 diabetes insulin treated with polyneuropathy  Acute kidney injury on top of CKD 2  Type 2 diabetes with renal complications  Metabolic encephalopathy  Elevated troponin without ACS  Hypokalemia K supplement given  Recurrent falls  Nausea vomiting diarrhea resolved  Abnormal MRI of the cervical spine with possible tumors of the MRI thoracic spine oncology consult obtained nerve sheath  Plan:  Meds labs reviewed continue with before meals and at bedtime Accu-Cheks with insulin coverage DVT prophylaxis oncology nephrology consultation obtained see yuliana Montoya MD  7:26 PM

## 2020-10-14 NOTE — PROGRESS NOTES
PEPCID 20MG IV  changed from IV to PO per [facility] approved policy. Basic Criteria (please refer to hospital policy for details):  1. functioning GI tract  2. tolerating PO/NG routine medications  2. Antibiotics: Received at least 24 hours of IV, clinical stabilization, afebrile, normalizing WBC)    Thank you.   King Bolus 10/14/2020 2:31 PM

## 2020-10-14 NOTE — PROGRESS NOTES
Physical Therapy  DATE: 10/14/2020    NAME: Kevin Ruvalcaba  MRN: [id number]   : 1950    Patient not seen this date for Physical Therapy due to:  [] Blood transfusion in progress  [x] Cancel by RN: Patient having many tests, more confused, just had difficult conversation with physician.  [] Hemodialysis  []  Refusal by Patient   [] Spine Precautions   [] Strict Bedrest  [] Surgery  [] Testing      [] Other        [] PT being discontinued at this time. Patient independent. No further needs. [] PT being discontinued at this time as the patient has been transferred to hospice care. No further needs.     Garcia Maddi, PT

## 2020-10-14 NOTE — PROGRESS NOTES
Active problem Metabolic encephalopathy . Nausea , vomiting, diarrhea . Falling episodes . Postural lightheadedness . Orthostatic hypotension . Rule out cervical myelopathic process . 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 The condition is  EEG mild diffuse slowing . MRI of cervical spine with multi level degenerative changes with canal stenosis C4-5 , C5-6 and C6-7 8 mm AP dimension . There are partial visualized masses posterior paraspinal soft tiisues at T2 and T3 levels with involvement of left side T2-3 and T3-4 foramen. Cardiac 2 D echo mild LVH . EF 55 % . Mild MR , TR and pulmonary hypertension . Carotid US 16-49 % stenosis bilaterally. He walked 20 feet with rolling walker with stand by assist . He was seen by cardiology for elevation of troponin which is felt pan nonspecific . This afternoon he is alert not orienetd picking at things lifting all limbs equally . Nursing report he received 0.5 mg ativan for MRI . This morning wife reports he was more oriented. Blood pressure supine 145/61 , siting 149/63 , standing 117/61 . Wife reports he has not eaten since this weekend . 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 He presents to ER with several days of diarrhea, nausea and vomiting . This has been accompanied  by confusion gait imbalannce frequent falling along with of slurring of speech . 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 He will use lyrica 25 mg po qAM , 100 mg po qhs for neuropathic pain with percocet PRN . Up to one week ago for 2 weeks his wife describes he had greater imbalance on his feet with postural lightheadedness falling 3 times per day according to wife . This weekend he developed nausea , vomiting and diarrhea accompanied by confusion and disorientation . He is improved with still mild disorientation .  He does report to have diabetic gastroparesis with nausea and vomiting in the past . There is no weakness, numbness , bulbar or visual complaint . Head CT and MRI of Head with mild atrophy . Toxicology screen is positive for opiates , oxycodone and THC . Significant medications lyrica 25 mg po qd and 100 mg po qhs  , plavix 75 mg po qd , elavil 50 mg po qhs , percocet PRN . Testing Head CT and MRI of Head with mild atrophy  . EEG mild diffuse slowing MRI of cervical spine with multi level degenerative changes with canal stenosis C4-5 , C5-6 and C6-7 8 mm AP dimension . There are partial visualized masses posterior paraspinal soft tissues at T2 and T3 levels with involvement of left side T2-3 and T3-4 foramen  . Cardiac 2 D echo mild LVH . EF 55 % . Mild MR , TR and pulmonary hypertension .  Carotid US 16-49 % stenosis bilaterally      Past Medical History:   Diagnosis Date    CAD (coronary artery disease)     Diabetes mellitus ([address]     Headache     Hyperlipidemia     Hypertension     Kidney stone        Past Surgical History:   Procedure Laterality Date    CARDIAC SURGERY      CHOLECYSTECTOMY         Family History   Problem Relation Age of Onset    Mental Illness Mother     Heart Attack Father     Coronary Art Dis Father     Cancer Sister        Social History     Socioeconomic History    Marital status: Single     Spouse name: None    Number of children: None    Years of education: None    Highest education level: None   Occupational History    None   Social Needs    Financial resource strain: None    Food insecurity     Worry: None     Inability: None    Transportation needs     Medical: None     Non-medical: None   Tobacco Use    Smoking status: Never Smoker    Smokeless tobacco: Never Used   Substance and Sexual Activity    Alcohol use: Yes     Comment: daily    Drug use: Never    Sexual activity: None   Lifestyle    Physical activity     Days per week: None     Minutes per session: None    Stress: None   Relationships    Social connections     Talks on phone: None     Gets together: None     Attends [employer] witness service: None     Active member of club or organization: None     Attends meetings of clubs or organizations: None     Relationship status: None    Intimate partner violence     Fear of current or ex partner: None     Emotionally abused: None     Physically abused: None     Forced sexual activity: None   Other Topics Concern    None   Social History Narrative    None       Current Facility-Administered Medications   Medication Dose Route Frequency Provider Last Rate Last Dose    olmesartan-amLODIPine-HCTZ 40-5-25 MG TABS 1 tablet  1 tablet Oral Daily Ashanti Dougherty MD   1 tablet at 10/14/20 1135    famotidine (PEPCID) tablet 20 mg  20 mg Oral BID Ashanti Dougherty MD        hydrALAZINE (APRESOLINE) injection 10 mg  10 mg Intravenous Q6H PRN Ashanti Dougherty MD   10 mg at 10/13/20 1533    insulin lispro (HUMALOG) injection vial 0-6 Units  0-6 Units Subcutaneous TID WC Ashanti Dougherty MD        insulin lispro (HUMALOG) injection vial 0-3 Units  0-3 Units Subcutaneous Nightly Ashanti Dougherty MD        potassium chloride (KLOR-CON M) extended release tablet 20 mEq  20 mEq Oral Once Ashanti Dougherty MD        metoprolol tartrate (LOPRESSOR) tablet 25 mg  25 mg Oral BID Ashanti Dougherty MD   25 mg at 10/1950    glucose (GLUTOSE) 40 % oral gel 15 g  15 g Oral PRN Ashanti Dougherty MD        dextrose 50 % IV solution  12.5 g Intravenous PRN Ashanti Dougherty MD        glucagon (rDNA) injection 1 mg  1 mg Intramuscular PRN Ashanti Dougherty MD        dextrose 5 % solution  100 mL/hr Intravenous PRN Ashanti Dougherty MD        LORazepam (ATIVAN) tablet 0.5 mg  0.5 mg Oral BID PRN Ashanti Dougherty MD   0.5 mg at 10/14/20 1135    0.9 % sodium chloride infusion   Intravenous Continuous Salli Glassing, APRN -  mL/hr at 10/12/20 2206      amitriptyline (ELAVIL) tablet 50 mg  50 mg Oral BID Marian Hodge MD   50 mg at 10/14/20 0610    clopidogrel (PLAVIX) tablet 75 mg  75 mg Oral Daily Marian Hodge MD   75 mg at 10/13/20 1109    levothyroxine (SYNTHROID) tablet 25 mcg  25 mcg Oral QAM AC Marian Hodge MD   25 mcg at 10/14/20 0610    pregabalin (LYRICA) capsule 25 mg  25 mg Oral Daily Marian Hodge MD   25 mg at 10/14/20 1135    sodium chloride flush 0.9 % injection 10 mL  10 mL Intravenous 2 times per day Marian Hodge MD   10 mL at 10/13/20 1948    sodium chloride flush 0.9 % injection 10 mL  10 mL Intravenous PRN Marian Hodge MD        acetaminophen (TYLENOL) tablet 650 mg  650 mg Oral Q6H PRN Marian Hodge MD        Or   Anna Mouraden acetaminophen (TYLENOL) suppository 650 mg  650 mg Rectal Q6H PRN Marian Hodge MD        polyethylene glycol (GLYCOLAX) packet 17 g  17 g Oral Daily PRN Marian Hodge MD        promethazine (PHENERGAN) tablet 12.5 mg  12.5 mg Oral Q6H PRN Marian Hodge MD        Or    ondansetron (ZOFRAN) injection 4 mg  4 mg Intravenous Q6H PRN Marian Hodge MD           No Known Allergies    ROS:   Constitutional                  Negative for fever and chills   HEENT                            Negative for ear discharge, ear pain, nosebleed  Eyes                                Negative for photophobia, pain and discharge  Respiratory                      Negative for hemoptysis and sputum  Cardiovascular                Negative for orthopnea, claudication and PND  Gastrointestinal               Positive for nausea , vomiting and diarrhea   Musculoskeletal               Negative for joint pain, negative for myalgia  Skin                                 Negative for rash or itching  Endo/heme/allergies       Negative for polydipsia, environmental allergy  Psychiatric                       Negative for suicidal ideation.   Patient is not anxious    Vitals:    10/14/20 1134   BP: (!) 146/67   Pulse: 75   Resp: 16   Temp: 97.7 °F (36.5 °C)   SpO2: 100%     Admission weight: 120 lb (54.4 kg)    Neurological Examination  Constitutional .General exam well groomed   Head/ Ears /Nose/Throat/external ear . Normal exam  Neck and thyroid . Normal size. No bruits  Respiratory . Breathsounds clear bilaterally  Cardiovascular: Auscultation of heart with regular rate and rhythm   Musculoskeletal. Muscle atrophy right ADM                                                            Muscle strength left iliopsoas 4/5 otherwise 5/5 strength throughout                                                                                No dysmetria or dysdiadokinesis  No tremor   Normal fine motor  Orientation Confused and oriented x 1   Attention and concentration reduced  Short term memory reduced  Language process and speech normal . No aphasia   Cranial nerve 2 normal acuety and visual fields  Cranial nerve 3, 4 and 6 . Extraocular muscles are intact . Pupils are equal and reactive   Cranial nerve 5 . Intact corneal reflex. Normal facial sensation  Cranial nerve 7 normal exam   Cranial nerve 8. Grossly intact hearing   Cranial nerve 9 and 10. Symmetric palate elevation   Cranial nerve 11 , 5 out of 5 strength   Cranial Nerve 12 midline tongue . No atrophy  Sensation . Decreased pinprick and light touch to ankle level  Deep Tendon Reflexes absent lower extremities , hypoactive upper extremities   Plantar response flexor bilaterally    Assessment :    Metabolic encephalopathy . Nausea , vomiting, diarrhea . Falling episodes . Postural lightheadedness . Rule out orthostatic hypotension . Rule out cervical myelopathic process . Diabetic polyneuropthy with left left leg diabetic amyotrophy . Thoracic paraspinal lesions . Orthostatic hypotension . Cervical stenosis     Plan:    MRI thoracic spine with and without contrast . CT chest abdomen and pelvis . ESR, CRP . Heme/Onc consultation .  LP under IR

## 2020-10-14 NOTE — CARE COORDINATION
SW met with pt and significant other to discuss discharge planning, SW explained the therapy team recommendation regarding SNF, pt is refusing and requesting home care with PT/OT in the home. ROSALIE provided pt with a list of homecare facilities to review and will inform BESSIE Maurer to follow up for choice.

## 2020-10-15 ENCOUNTER — APPOINTMENT (OUTPATIENT)
Dept: INTERVENTIONAL RADIOLOGY/VASCULAR | Age: 70
DRG: 917 | End: 2020-10-15
Payer: MEDICARE

## 2020-10-15 ENCOUNTER — APPOINTMENT (OUTPATIENT)
Dept: MRI IMAGING | Age: 70
DRG: 917 | End: 2020-10-15
Payer: MEDICARE

## 2020-10-15 LAB
ANION GAP SERPL CALCULATED.3IONS-SCNC: 11 MMOL/L (ref 9–17)
APPEARANCE CSF: CLEAR
BUN BLDV-MCNC: 37 MG/DL (ref 8–23)
BUN/CREAT BLD: 31 (ref 9–20)
CALCIUM SERPL-MCNC: 9 MG/DL (ref 8.6–10.4)
CHLORIDE BLD-SCNC: 99 MMOL/L (ref 98–107)
CO2: 23 MMOL/L (ref 20–31)
CREAT SERPL-MCNC: 1.2 MG/DL (ref 0.7–1.2)
CREATININE URINE: 38.7 MG/DL (ref 39–259)
CRYPTOCOCCUS NEOFORMANS/GATTI CSF FILM ARR.: NOT DETECTED
CYTOMEGALOVIRUS (CMV) CSF FILM ARRAY: NOT DETECTED
ENTEROVIRUS CSF FILM ARRAY: NOT DETECTED
ESCHERICHIA COLI K1 CSF FILM ARRAY: NOT DETECTED
GFR AFRICAN AMERICAN: >60 ML/MIN
GFR NON-AFRICAN AMERICAN: 60 ML/MIN
GFR SERPL CREATININE-BSD FRML MDRD: ABNORMAL ML/MIN/{1.73_M2}
GFR SERPL CREATININE-BSD FRML MDRD: ABNORMAL ML/MIN/{1.73_M2}
GLUCOSE BLD-MCNC: 107 MG/DL (ref 75–110)
GLUCOSE BLD-MCNC: 122 MG/DL (ref 75–110)
GLUCOSE BLD-MCNC: 186 MG/DL (ref 75–110)
GLUCOSE BLD-MCNC: 191 MG/DL (ref 75–110)
GLUCOSE BLD-MCNC: 200 MG/DL (ref 70–99)
GLUCOSE, CSF: 122 MG/DL (ref 40–70)
HAEMOPHILUS INFLUENZA CSF FILM ARRAY: NOT DETECTED
HCT VFR BLD CALC: 39.4 % (ref 40.7–50.3)
HEMOGLOBIN: 13.4 G/DL (ref 13–17)
HHV-6 (HERPESVIRUS 6) CSF FILM ARRAY: NOT DETECTED
HSV-1 CSF FILM ARRAY: NOT DETECTED
HSV-2 CSF FILM ARRAY: NOT DETECTED
LISTERIA MONOCYTOGENES CSF FILM ARRAY: NOT DETECTED
MAGNESIUM: 2.1 MG/DL (ref 1.6–2.6)
MCH RBC QN AUTO: 30 PG (ref 25.2–33.5)
MCHC RBC AUTO-ENTMCNC: 34 G/DL (ref 28.4–34.8)
MCV RBC AUTO: 88.3 FL (ref 82.6–102.9)
NEISSERIA MENIGITIDIS CSF FILM ARRAY: NOT DETECTED
NRBC AUTOMATED: 0 PER 100 WBC
PARECHOVIRUS CSF FILM ARRAY: NOT DETECTED
PDW BLD-RTO: 12.9 % (ref 11.8–14.4)
PHOSPHORUS: 2.8 MG/DL (ref 2.5–4.5)
PLATELET # BLD: 302 K/UL (ref 138–453)
PMV BLD AUTO: 8.9 FL (ref 8.1–13.5)
POTASSIUM SERPL-SCNC: 3 MMOL/L (ref 3.7–5.3)
PROTEIN CSF: 88 MG/DL (ref 15–45)
RBC # BLD: 4.46 M/UL (ref 4.21–5.77)
RBC CSF: 28 /MM3
SODIUM BLD-SCNC: 133 MMOL/L (ref 135–144)
SPECIMEN DESCRIPTION: NORMAL
STREPTOCOCCUS AGALACTIAE CSF FILM ARRAY: NOT DETECTED
STREPTOCOCCUS PNEUMONIAE CSF FILM ARRAY: NOT DETECTED
SUPERNAT COLOR CSF: COLORLESS
TOTAL PROTEIN, URINE: 65 MG/DL
TUBE NUMBER CSF: 3
URINE TOTAL PROTEIN CREATININE RATIO: 1.68 (ref 0–0.2)
VARICELLA-ZOSTER CSF FILM ARRAY: NOT DETECTED
VOLUME CSF: 8
WBC # BLD: 10.4 K/UL (ref 3.5–11.3)
WBC CSF: 2 /MM3
XANTHOCHROMIA: ABNORMAL

## 2020-10-15 PROCEDURE — 009U3ZX DRAINAGE OF SPINAL CANAL, PERCUTANEOUS APPROACH, DIAGNOSTIC: ICD-10-PCS | Performed by: RADIOLOGY

## 2020-10-15 PROCEDURE — 83516 IMMUNOASSAY NONANTIBODY: CPT

## 2020-10-15 PROCEDURE — 92526 ORAL FUNCTION THERAPY: CPT

## 2020-10-15 PROCEDURE — 6360000002 HC RX W HCPCS: Performed by: INTERNAL MEDICINE

## 2020-10-15 PROCEDURE — 80048 BASIC METABOLIC PNL TOTAL CA: CPT

## 2020-10-15 PROCEDURE — 84156 ASSAY OF PROTEIN URINE: CPT

## 2020-10-15 PROCEDURE — 2709999900 IR LUMBAR PUNCTURE FOR DIAGNOSIS

## 2020-10-15 PROCEDURE — 99232 SBSQ HOSP IP/OBS MODERATE 35: CPT | Performed by: PSYCHIATRY & NEUROLOGY

## 2020-10-15 PROCEDURE — 84157 ASSAY OF PROTEIN OTHER: CPT

## 2020-10-15 PROCEDURE — 87205 SMEAR GRAM STAIN: CPT

## 2020-10-15 PROCEDURE — 85027 COMPLETE CBC AUTOMATED: CPT

## 2020-10-15 PROCEDURE — 62328 DX LMBR SPI PNXR W/FLUOR/CT: CPT

## 2020-10-15 PROCEDURE — 83735 ASSAY OF MAGNESIUM: CPT

## 2020-10-15 PROCEDURE — 82947 ASSAY GLUCOSE BLOOD QUANT: CPT

## 2020-10-15 PROCEDURE — 87015 SPECIMEN INFECT AGNT CONCNTJ: CPT

## 2020-10-15 PROCEDURE — 82088 ASSAY OF ALDOSTERONE: CPT

## 2020-10-15 PROCEDURE — 82570 ASSAY OF URINE CREATININE: CPT

## 2020-10-15 PROCEDURE — 84100 ASSAY OF PHOSPHORUS: CPT

## 2020-10-15 PROCEDURE — 72157 MRI CHEST SPINE W/O & W/DYE: CPT

## 2020-10-15 PROCEDURE — 87070 CULTURE OTHR SPECIMN AEROBIC: CPT

## 2020-10-15 PROCEDURE — 6370000000 HC RX 637 (ALT 250 FOR IP): Performed by: INTERNAL MEDICINE

## 2020-10-15 PROCEDURE — 6360000004 HC RX CONTRAST MEDICATION: Performed by: PSYCHIATRY & NEUROLOGY

## 2020-10-15 PROCEDURE — 82945 GLUCOSE OTHER FLUID: CPT

## 2020-10-15 PROCEDURE — 36415 COLL VENOUS BLD VENIPUNCTURE: CPT

## 2020-10-15 PROCEDURE — 89050 BODY FLUID CELL COUNT: CPT

## 2020-10-15 PROCEDURE — 2060000000 HC ICU INTERMEDIATE R&B

## 2020-10-15 PROCEDURE — 84244 ASSAY OF RENIN: CPT

## 2020-10-15 PROCEDURE — 87483 CNS DNA AMP PROBE TYPE 12-25: CPT

## 2020-10-15 PROCEDURE — 97116 GAIT TRAINING THERAPY: CPT

## 2020-10-15 PROCEDURE — 99223 1ST HOSP IP/OBS HIGH 75: CPT | Performed by: INTERNAL MEDICINE

## 2020-10-15 PROCEDURE — A9579 GAD-BASE MR CONTRAST NOS,1ML: HCPCS | Performed by: PSYCHIATRY & NEUROLOGY

## 2020-10-15 PROCEDURE — 2580000003 HC RX 258: Performed by: INTERNAL MEDICINE

## 2020-10-15 RX ORDER — LOSARTAN POTASSIUM 100 MG/1
100 TABLET ORAL DAILY
Status: DISCONTINUED | OUTPATIENT
Start: 2020-10-16 | End: 2020-10-17 | Stop reason: HOSPADM

## 2020-10-15 RX ORDER — AMLODIPINE BESYLATE 5 MG/1
5 TABLET ORAL DAILY
Status: DISCONTINUED | OUTPATIENT
Start: 2020-10-16 | End: 2020-10-17

## 2020-10-15 RX ADMIN — LORAZEPAM 0.5 MG: 0.5 TABLET ORAL at 13:03

## 2020-10-15 RX ADMIN — METOPROLOL TARTRATE 25 MG: 25 TABLET, FILM COATED ORAL at 08:49

## 2020-10-15 RX ADMIN — AMITRIPTYLINE HYDROCHLORIDE 50 MG: 50 TABLET, FILM COATED ORAL at 20:58

## 2020-10-15 RX ADMIN — FAMOTIDINE 20 MG: 20 TABLET ORAL at 08:49

## 2020-10-15 RX ADMIN — AMITRIPTYLINE HYDROCHLORIDE 50 MG: 50 TABLET, FILM COATED ORAL at 08:49

## 2020-10-15 RX ADMIN — OLMESARTAN MEDOXOMIL, AMLODIPINE AND HYDROCHLOROTHIAZIDE TABLET 40/5/25 MG 1 TABLET: 40; 5; 25 TABLET ORAL at 08:49

## 2020-10-15 RX ADMIN — INSULIN LISPRO 1 UNITS: 100 INJECTION, SOLUTION INTRAVENOUS; SUBCUTANEOUS at 20:59

## 2020-10-15 RX ADMIN — METOPROLOL TARTRATE 25 MG: 25 TABLET, FILM COATED ORAL at 20:58

## 2020-10-15 RX ADMIN — FAMOTIDINE 20 MG: 20 TABLET ORAL at 20:58

## 2020-10-15 RX ADMIN — SODIUM CHLORIDE: 9 INJECTION, SOLUTION INTRAVENOUS at 03:14

## 2020-10-15 RX ADMIN — CLOPIDOGREL 75 MG: 75 TABLET, FILM COATED ORAL at 08:49

## 2020-10-15 RX ADMIN — LEVOTHYROXINE SODIUM 25 MCG: 0.03 TABLET ORAL at 06:13

## 2020-10-15 RX ADMIN — HYDRALAZINE HYDROCHLORIDE 10 MG: 20 INJECTION, SOLUTION INTRAMUSCULAR; INTRAVENOUS at 00:07

## 2020-10-15 RX ADMIN — SODIUM CHLORIDE: 9 INJECTION, SOLUTION INTRAVENOUS at 18:48

## 2020-10-15 RX ADMIN — GADOTERIDOL 11 ML: 279.3 INJECTION, SOLUTION INTRAVENOUS at 14:21

## 2020-10-15 RX ADMIN — PREGABALIN 25 MG: 25 CAPSULE ORAL at 08:49

## 2020-10-15 RX ADMIN — INSULIN LISPRO 1 UNITS: 100 INJECTION, SOLUTION INTRAVENOUS; SUBCUTANEOUS at 12:43

## 2020-10-15 ASSESSMENT — PAIN SCALES - GENERAL
PAINLEVEL_OUTOF10: 3
PAINLEVEL_OUTOF10: 8

## 2020-10-15 NOTE — PROGRESS NOTES
Active problem Metabolic encephalopathy . Nausea , vomiting, diarrhea . Falling episodes . Postural lightheadedness . Orthostatic hypotension . Cervical stenosis  . 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 The condition is  MRI of thoracic spine with hyperintense multi lobulated mass 4.1 x 3 x 4.8 cm  in paraspinal soft tissue adjacent to left T2 throughT4 with extension towards left T3-T4 an T4-T5 foramen. CT chest abdomen and pelvis with ill defined sclerotic lesion right sacrum 1.9 x 1.2 cm. Creatinine is 1.2 being followed by nephrology  . He walked 15 feet with rolling walker in PT . He proceeded with lumbar puncture WBC 2 , RBC 28 total protein and glucose pending today . He is alert today conversive oriented x 3 lifting all limbs equally . Blood pressure supine 145/61 , siting 149/63 , standing 117/61. EEG mild diffuse slowing . MRI of cervical spine with multi level degenerative changes with canal stenosis C4-5 , C5-6 and C6-7 8 mm AP dimension . There are partial visualized masses posterior paraspinal soft tiisues at T2 and T3 levels with involvement of left side T2-3 and T3-4 foramen. Cardiac 2 D echo mild LVH . EF 55 % . Mild MR , TR and pulmonary hypertension . Carotid US 16-49 % stenosis bilaterally. He was seen by cardiology for elevation of troponin which is felt pan nonspecific . . 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 This has been accompanied  by confusion gait imbalannce frequent falling along with of slurring of speech . Dr Aron Akers describes that he has diabetes mellitus with diabetic polyneuropathy having had in 2016 left leg diabetic amyotrophy with proximal left leg weakness and pain left with some residual . He will use lyrica 25 mg po qAM , 100 mg po qhs for neuropathic pain with percocet PRN .  Up to one week ago for 2 weeks his wife describes he had greater imbalance on his feet with postural lightheadedness falling 3 times per day according to wife . This weekend he developed nausea , vomiting and diarrhea accompanied by confusion and disorientation . He is improved with still mild disorientation . He does report to have diabetic gastroparesis with nausea and vomiting in the past . There is no weakness, numbness , bulbar or visual complaint . Head CT and MRI of Head with mild atrophy . Toxicology screen is positive for opiates , oxycodone and THC . Significant medications lyrica 25 mg po qd and 100 mg po qhs  , plavix 75 mg po qd , elavil 50 mg po qhs , percocet PRN . Testing Head CT and MRI of Head with mild atrophy  . EEG mild diffuse slowing MRI of cervical spine with multi level degenerative changes with canal stenosis C4-5 , C5-6 and C6-7 8 mm AP dimension . There are partial visualized masses posterior paraspinal soft tissues at T2 and T3 levels with involvement of left side T2-3 and T3-4 foramen  . Cardiac 2 D echo mild LVH . EF 55 % . Mild MR , TR and pulmonary hypertension . Carotid US 16-49 % stenosis bilaterally . MRI of thoracic spine with hyperintense multi lobulated mass 4.1 x 3 x 4.8 cm  in paraspinal soft tissue adjacent to left T2 throughT4 with extension towards left T3-T4 an T4-T5 foramen. CT chest abdomen and pelvis with ill defined sclerotic lesion right sacrum 1.9 x 1.2 cm . CRP 3.8 .  CSF analysis WBC 2 , RBC 28     Past Medical History:   Diagnosis Date    CAD (coronary artery disease)     Diabetes mellitus ([address]     Headache     Hyperlipidemia     Hypertension     Kidney stone        Past Surgical History:   Procedure Laterality Date    CARDIAC SURGERY      CHOLECYSTECTOMY         Family History   Problem Relation Age of Onset    Mental Illness Mother     Heart Attack Father     Coronary Art Dis Father     Cancer Sister        Social History     Socioeconomic History    Marital status: Single     Spouse name: None    Number of children: None    Years of education: None    Highest education level: None   Occupational History    None   Social Needs    Financial resource strain: None    Food insecurity     Worry: None     Inability: None    Transportation needs     Medical: None     Non-medical: None   Tobacco Use    Smoking status: Never Smoker    Smokeless tobacco: Never Used   Substance and Sexual Activity    Alcohol use: Yes     Comment: daily    Drug use: Never    Sexual activity: None   Lifestyle    Physical activity     Days per week: None     Minutes per session: None    Stress: None   Relationships    Social connections     Talks on phone: None     Gets together: None     Attends Mandaen service: None     Active member of club or organization: None     Attends meetings of clubs or organizations: None     Relationship status: None    Intimate partner violence     Fear of current or ex partner: None     Emotionally abused: None     Physically abused: None     Forced sexual activity: None   Other Topics Concern    None   Social History Narrative    None       Current Facility-Administered Medications   Medication Dose Route Frequency Provider Last Rate Last Dose    [START ON 10/16/2020] losartan (COZAAR) tablet 100 mg  100 mg Oral Daily ANDRADE Phoenix CNP        [START ON 10/16/2020] amLODIPine (NORVASC) tablet 5 mg  5 mg Oral Daily ANDRADE Phoenix CNP        famotidine (PEPCID) tablet 20 mg  20 mg Oral BID Kaley West MD   20 mg at 10/15/20 0849    sodium chloride flush 0.9 % injection 10 mL  10 mL Intravenous PRN Nan Greene MD   10 mL at 10/14/20 1748    iopamidol (ISOVUE-370) 76 % injection 100 mL  100 mL Intravenous ONCE PRN Nan Greene MD        hydrALAZINE (APRESOLINE) injection 10 mg  10 mg Intravenous Q6H PRN Kaley West MD   10 mg at 10/15/20 0007    insulin lispro (HUMALOG) injection vial 0-6 Units  0-6 Units Subcutaneous TID  Kaley West MD   1 Units at 10/15/20 1243  insulin lispro (HUMALOG) injection vial 0-3 Units  0-3 Units Subcutaneous Nightly Tomy Torres MD        potassium chloride (KLOR-CON M) extended release tablet 20 mEq  20 mEq Oral Once Tomy Torres MD        metoprolol tartrate (LOPRESSOR) tablet 25 mg  25 mg Oral BID Tomy Torres MD   25 mg at 10/15/20 0849    glucose (GLUTOSE) 40 % oral gel 15 g  15 g Oral PRN Tomy Torres MD        dextrose 50 % IV solution  12.5 g Intravenous PRN Tomy Torres MD        glucagon (rDNA) injection 1 mg  1 mg Intramuscular PRN Tomy Torres MD        dextrose 5 % solution  100 mL/hr Intravenous PRN Tomy Torres MD        LORazepam (ATIVAN) tablet 0.5 mg  0.5 mg Oral BID PRN Tomy Torres MD   0.5 mg at 10/15/20 1303    0.9 % sodium chloride infusion   Intravenous Continuous Tomy Torres MD 75 mL/hr at 10/15/20 0314      amitriptyline (ELAVIL) tablet 50 mg  50 mg Oral BID Tomy Torres MD   50 mg at 10/15/20 0849    clopidogrel (PLAVIX) tablet 75 mg  75 mg Oral Daily Tomy Torres MD   75 mg at 10/15/20 0849    levothyroxine (SYNTHROID) tablet 25 mcg  25 mcg Oral QAM AC Tomy Torres MD   25 mcg at 10/15/20 5276    pregabalin (LYRICA) capsule 25 mg  25 mg Oral Daily Tomy Torres MD   25 mg at 10/15/20 0849    sodium chloride flush 0.9 % injection 10 mL  10 mL Intravenous 2 times per day Tomy Torres MD   10 mL at 10/13/20 1948    sodium chloride flush 0.9 % injection 10 mL  10 mL Intravenous PRN Tomy Torres MD        acetaminophen (TYLENOL) tablet 650 mg  650 mg Oral Q6H PRN Tomy Torres MD        Or    acetaminophen (TYLENOL) suppository 650 mg  650 mg Rectal Q6H PRN Tomy Torres MD        polyethylene glycol (GLYCOLAX) packet 17 g  17 g Oral Daily PRN Tomy Torres MD        promethazine (PHENERGAN) tablet 12.5 mg  12.5 mg Oral Q6H PRN Tomy Torres MD        Or    ondansetron (ZOFRAN) injection 4 mg  4 mg Intravenous Q6H PRN Silvana Shields Lorena Atkinson MD           No Known Allergies    ROS:   Constitutional                  Negative for fever and chills   HEENT                            Negative for ear discharge, ear pain, nosebleed  Eyes                                Negative for photophobia, pain and discharge  Respiratory                      Negative for hemoptysis and sputum  Cardiovascular                Negative for orthopnea, claudication and PND  Gastrointestinal               Positive for nausea , vomiting and diarrhea   Musculoskeletal               Negative for joint pain, negative for myalgia  Skin                                 Negative for rash or itching  Endo/heme/allergies       Negative for polydipsia, environmental allergy  Psychiatric                       Negative for suicidal ideation. Patient is not anxious    Vitals:    10/15/20 1204   BP: (!) 159/60   Pulse: 58   Resp: 16   Temp: 97.3 °F (36.3 °C)   SpO2: 98%     Admission weight: 120 lb (54.4 kg)    Neurological Examination  Constitutional .General exam well groomed   Head/ Ears /Nose/Throat/external ear . Normal exam  Neck and thyroid . Normal size. No bruits  Respiratory . Breathsounds clear bilaterally  Cardiovascular: Auscultation of heart with regular rate and rhythm   Musculoskeletal. Muscle atrophy right ADM                                                            Muscle strength left iliopsoas 4/5 otherwise 5/5 strength throughout                                                                                No dysmetria or dysdiadokinesis  No tremor   Normal fine motor  Orientation Confused and oriented x 3   Attention and concentration reduced  Short term memory reduced  Language process and speech normal . No aphasia   Cranial nerve 2 normal acuety and visual fields  Cranial nerve 3, 4 and 6 . Extraocular muscles are intact . Pupils are equal and reactive   Cranial nerve 5 . Intact corneal reflex.  Normal facial sensation  Cranial nerve 7 normal exam   Cranial nerve 8. Grossly intact hearing   Cranial nerve 9 and 10. Symmetric palate elevation   Cranial nerve 11 , 5 out of 5 strength   Cranial Nerve 12 midline tongue . No atrophy  Sensation . Decreased pinprick and light touch to ankle level  Deep Tendon Reflexes absent lower extremities , hypoactive upper extremities   Plantar response flexor bilaterally    Assessment :    Metabolic encephalopathy . Nausea , vomiting, diarrhea . Falling episodes . Postural lightheadedness . Othostatic hypotension . Diabetic polyneuropthy with left left leg diabetic amyotrophy . Thoracic paraspinal lesion benign versus malignant  . Cervical stenosis . Right sacrum sclerotic lesion     Plan:    Await Heme/Onc consultation . Neurosurgery consultation for thoracic spinal lesion . Question neurofibroma  . Possible biopsy of lesion . Will defer to oncology .  MRI of sacrum as suggested by radiology

## 2020-10-15 NOTE — PROGRESS NOTES
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 RN to inform IR and patient. Will continue to monitor.

## 2020-10-15 NOTE — PROGRESS NOTES
Reason for Consult: Proteinuria    Requesting Physician:  Kaley West MD    INTERVAL HISTORY: No new labs. 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 His work-up was remarkable for a creatinine of 1.46, baseline creatinine has been 0.9-1.1. His urine dipstick was remarkable for +2 proteinuria  He does have a history of diabetes for about 8 years usually well controlled  Has a history of hypertension the same period of time and it is usually well controlled  He is aware of having some kidney function abnormalities and proteinuria, he reported that couple of years ago he saw Dr. Lanie Walsh and he was told that his kidneys are not bad and that he was slightly dehydrated  His mental status showed significant improvement, still does not feel that he is back to normal yet but feels that he is able to remember everything well  Blood pressure has been elevated with max blood pressure of 212/86, most recent blood pressure was 145/61    Review Of Systems:   Constitutional: No fever, chills, lethargy, weakness or wt loss. HEENT:  No headache, nasal discharge or sore throat. Cardiac:  No chest pain, dyspnea, orthopnea or PND. Chest:              No cough, phlegm or wheezing. Abdomen:  No abdominal pain, had nausea, and vomiting   Neuro:             No gross focal weakness, numbness, had confusion upon admission. No abnormal movements or seizure like activity. Skin:   No rashes or itching. :   No hematuria, pyuria, dysuria or flank pain. Extremities:  No swelling or joint pains.   Psych:            No depression or anxiety     Past Medical History:   Diagnosis Date    CAD (coronary artery disease)     Diabetes mellitus ([facility] [address]     Headache     Hyperlipidemia     Hypertension     Kidney stone        Past Surgical History:   Procedure Laterality Date    CARDIAC SURGERY      CHOLECYSTECTOMY         Prior to Admission medications    Medication Sig Start Date End Date Taking? Authorizing Provider   olmesartan-amLODIPine-HCTZ (TRIBENZOR) 40-5-25 MG TABS Take 1 tablet by mouth daily    Yes Historical Provider, MD   ALPRAZolam Danney Moody) 1 MG tablet Take 1 mg by mouth daily. Yes Historical Provider, MD   pregabalin (LYRICA) 25 MG capsule Take 25 mg by mouth daily. Yes Historical Provider, MD   Insulin Glargine, 1 Unit Dial, (TOUJEO SOLOSTAR) 300 UNIT/ML SOPN Inject 40 Units into the skin daily   Yes Historical Provider, MD   amitriptyline (ELAVIL) 50 MG tablet Take 50 mg by mouth 2 times daily   Yes Historical Provider, MD   oxyCODONE-acetaminophen (PERCOCET) 5-325 MG per tablet Take 1 tablet by mouth 2 times daily as needed for Pain.    Yes Historical Provider, MD   levothyroxine (SYNTHROID) 25 MCG tablet Take 25 mcg by mouth every morning (before breakfast)   Yes Historical Provider, MD   clopidogrel (PLAVIX) 75 MG tablet Take 75 mg by mouth daily   Yes Historical Provider, MD       Scheduled Meds:   olmesartan-amLODIPine-HCTZ  1 tablet Oral Daily    famotidine  20 mg Oral BID    insulin lispro  0-6 Units Subcutaneous TID WC    insulin lispro  0-3 Units Subcutaneous Nightly    potassium chloride  20 mEq Oral Once    metoprolol tartrate  25 mg Oral BID    amitriptyline  50 mg Oral BID    clopidogrel  75 mg Oral Daily    levothyroxine  25 mcg Oral QAM AC    pregabalin  25 mg Oral Daily    sodium chloride flush  10 mL Intravenous 2 times per day     Continuous Infusions:   dextrose      sodium chloride 75 mL/hr at 10/15/20 0314     PRN Meds:sodium chloride flush, iopamidol, hydrALAZINE, glucose, dextrose, glucagon (rDNA), dextrose, LORazepam, sodium chloride flush, acetaminophen **OR** acetaminophen, polyethylene glycol, promethazine **OR** ondansetron    No Known Allergies    Social History     Socioeconomic History    Marital status: Single     Spouse name: Not on file    Number of children: Not on file    Years of education: Not on file    Highest education level: Not on file   Occupational History    Not on file   Social Needs    Financial resource strain: Not on file    Food insecurity     Worry: Not on file     Inability: Not on file    Transportation needs     Medical: Not on file     Non-medical: Not on file   Tobacco Use    Smoking status: Never Smoker    Smokeless tobacco: Never Used   Substance and Sexual Activity    Alcohol use: Yes     Comment: daily    Drug use: Never    Sexual activity: Not on file   Lifestyle    Physical activity     Days per week: Not on file     Minutes per session: Not on file    Stress: Not on file   Relationships    Social connections     Talks on phone: Not on file     Gets together: Not on file     Attends Druze service: Not on file     Active member of club or organization: Not on file     Attends meetings of clubs or organizations: Not on file     Relationship status: Not on file    Intimate partner violence     Fear of current or ex partner: Not on file     Emotionally abused: Not on file     Physically abused: Not on file     Forced sexual activity: Not on file   Other Topics Concern    Not on file   Social History Narrative    Not on file       Family History   Problem Relation Age of Onset    Mental Illness Mother     Heart Attack Father     Coronary Art Dis Father     Cancer Sister          Physical Exam:  Vitals:    10/15/20 0038 10/15/20 0405 10/15/20 0847 10/15/20 1204   BP: (!) 132/51 (!) 163/65 (!) 129/56 (!) 159/60   Pulse: 55 55 81 58   Resp:  16 16 16   Temp:  98 °F (36.7 °C) 97.3 °F (36.3 °C) 97.3 °F (36.3 °C)   TempSrc:  Oral Oral Oral   SpO2:  97% 99% 98%   Weight:       Height:         I/O last 3 completed shifts: In: 0665 [P.O.:240; I.V.:950]  Out: 775 [Urine:775]    General:  Awake, alert, not in distress. Appears to be stated age. HEENT: Atraumatic, normocephalic. Anicteric sclera.  Pink and moist oral mucosa. Neck supple. No JVD. Chest: Bilateral air entry, clear to auscultation, no wheezing, rhonchi or rales. Cardiovascular: RRR, S1S2, no murmur, rub or gallop. No lower extremity edema. Abdomen: Soft, non tender to palpation. Musculoskeletal: No cyanosis or clubbing. Integumentary: Pink, warm and dry. Free from rash or lesions. CNS: Oriented to person, place and time. Speech clear. Face symmetrical. No tremor. Psych: Answering questions appropriately,.   Mood and affect    Data:  CBC:   Lab Results   Component Value Date    WBC 13.8 (H) 10/14/2020    HGB 14.4 10/14/2020    HCT 43.7 10/14/2020    MCV 90.7 10/14/2020     10/14/2020     BMP:    Lab Results   Component Value Date     10/14/2020     10/13/2020     10/12/2020    K 3.2 (L) 10/14/2020    K 3.5 (L) 10/13/2020    K 4.6 10/12/2020     10/14/2020     10/13/2020     10/12/2020    CO2 20 10/14/2020    CO2 23 10/13/2020    CO2 22 10/12/2020    BUN 33 (H) 10/14/2020    BUN 38 (H) 10/13/2020    BUN 46 (H) 10/12/2020    CREATININE 0.99 10/14/2020    CREATININE 1.06 10/13/2020    CREATININE 1.46 (H) 10/12/2020    GLUCOSE 121 (H) 10/14/2020    GLUCOSE 138 (H) 10/13/2020    GLUCOSE 185 (H) 10/12/2020     CMP:   Lab Results   Component Value Date     10/14/2020    K 3.2 10/14/2020     10/14/2020    CO2 20 10/14/2020    BUN 33 10/14/2020    CREATININE 0.99 10/14/2020    GLUCOSE 121 10/14/2020    CALCIUM 9.6 10/14/2020    PROT 6.9 10/14/2020    LABALBU 4.5 10/14/2020    BILITOT 0.89 10/14/2020    ALKPHOS 67 10/14/2020    AST 19 10/14/2020    ALT 11 10/14/2020      Hepatic:   Lab Results   Component Value Date    AST 19 10/14/2020    AST 15 10/13/2020    AST 11 10/12/2020    ALT 11 10/14/2020    ALT 9 10/13/2020    ALT 8 10/12/2020    BILITOT 0.89 10/14/2020    BILITOT 0.82 10/13/2020    BILITOT 0.73 10/12/2020    ALKPHOS 67 10/14/2020    ALKPHOS 60 10/13/2020    ALKPHOS 65 10/12/2020     BNP: No results found for: BNP  Lipids: No results found for: CHOL, HDL  INR: No results found for: INR  PTH: No results found for: PTH  Phosphorus:  No results found for: PHOS  Ionized Calcium: No results found for: IONCA  Magnesium:   Lab Results   Component Value Date    MG 2.1 10/14/2020     Albumin:   Lab Results   Component Value Date    LABALBU 4.5 10/14/2020     Last 3 CK, CKMB, Troponin: @LABRCNT(CKTOTAL:3,CKMB:3,TROPONINI:3)       URINE:)No results found for: Armand Corey     Radiology:   Chest x-ray  1. No acute intrathoracic abnormality.  No evidence of intrathoracic    metastatic disease. 2. No acute intra-abdominal abnormality.  Hepatic steatosis. 3. Ill-defined sclerotic lesion in the right sacrum is indeterminate.  MRI    with and without contrast is recommended for further characterization. 4. Prior cholecystectomy.              Renal ultrasound  The right kidney measures 10.4 cm in length and the left kidney measures 10.5    cm in length.         Diffusely increased renal parenchymal echogenicity noted bilaterally,    suggestive of chronic medical renal disease.  No evidence for nephrolithiasis    or hydronephrosis.  There multiple cystic lesions within the upper pole of    the left kidney measuring 0.5 x 0.8 x 0.4 cm and 2.9 x 2.9 x 3.2 cm.               Assessment:  1. Acute kidney injury, caused by volume depletion, function is back to baseline  2. Proteinuria, type factorial secondary to diabetic nephropathy hypertensive nephrosclerosis  3. Mental status changes  4. Hypertension  5. Hypokalemia    Plan:    Follow up labs today and daily. urine protein/Cr 1.68. Serum protein immunofixation was negative  Renal ultrasound was suggestive of chronic medical renal disease  Continue olmesartan, amlodipine, metoprolol. -160s. DC HCTZ.   If the patient will continue to be at risk of volume depletion we recommend against using hydrochlorothiazide on the long-term  Follow up aldosterone and renin levels. According to the patient he had full GN work-up done by Dr. Johnny Carcamo in the last couple of years, but will be no point of rechecking his labs, if urine protein creatinine ratio showed significant proteinuria we may consider other testing    Please do not hesitate to contact us for any further questions/concerns. We will continue to follow along with you. Pt seen in collaboration with Dr. Nadiya Frank. Electronically signed by ANDRADE Bower CNP  on 10/15/2020 at 12:51 PM     Physician Addendum  I have seen and examined pt at bed side.    Will check ANCA  I reviewed and agree with CNP's note. I performed all key and critical portions of this evaluation.  I agree with the plan of care as noted above.     Electronically signed by Robert Boggs MD on 10/15/20 3:37 PM

## 2020-10-15 NOTE — PROGRESS NOTES
Speech Language Pathology  [facility]  Speech Language Pathology    Date: 10/15/2020  Patient Name: Gaurav Smith  YOB: 1950   AGE: 69 y.o. MRN: [id number]        Patient Not Available for Speech Therapy     Due to:  [] Testing  [] Hemodialysis  [] Cancelled by RN  [] Surgery   [] Intubation/Sedation/Pain Medication  [] Medical instability  [x] Other: Attempted to see pt. For cognitive therapy. 940: pt. on phone with physician, 1000: Pt. With therapy    Next scheduled treatment: 10/16  Completed by: Rosanna Hope M.A.  CCC-SLP

## 2020-10-15 NOTE — PROGRESS NOTES
Subjective:    Type 2 diabetes  No polyuria no polydipsia no hypoglycemia blood sugars reviewed in the 200 range  ROS  No chills decreased appetite no nausea vomiting no abdominal pain, no  or cardiopulmonary complaints no TIA no bleeding no headache no sore throat no skin lesions no fatigue  physical exam  General Appearance: in no acute distress and alert  Skin: warm and dry, no rash or erythema  Head: normocephalic and atraumatic  Eyes: pupils equal, round, and reactive to light, conjunctivae normal and sclera anicteric  Neck: neck supple and non tender without mass   Pulmonary/Chest: clear to auscultation bilaterally- no wheezes, rales or rhonchi, normal air movement, no respiratory distress  Cardiovascular: normal rate, regular rhythm, normal S1 and S2, no gallops, intact distal pulses and no carotid bruits  Abdomen: soft, non-tender, non-distended, normal bowel sounds, no masses or organomegaly  Extremities: no edema and good pulses no Homans' sign    Neurologic: Alert oriented x2 no focal deficit weakness left leg    BP (!) 154/61   Pulse 80   Temp 98.1 °F (36.7 °C) (Oral)   Resp 16   Ht 5' 9\" (1.753 m)   Wt 120 lb (54.4 kg)   SpO2 97%   BMI 17.72 kg/m²     CBC:   Lab Results   Component Value Date    WBC 10.4 10/15/2020    RBC 4.46 10/15/2020    HGB 13.4 10/15/2020    HCT 39.4 10/15/2020    MCV 88.3 10/15/2020    MCH 30.0 10/15/2020    MCHC 34.0 10/15/2020    RDW 12.9 10/15/2020     10/15/2020    MPV 8.9 10/15/2020     CMP:    Lab Results   Component Value Date     10/15/2020    K 3.0 10/15/2020    CL 99 10/15/2020    CO2 23 10/15/2020    BUN 37 10/15/2020    CREATININE 1.20 10/15/2020    GFRAA >60 10/15/2020    LABGLOM 60 10/15/2020    GLUCOSE 200 10/15/2020    PROT 6.9 10/14/2020    LABALBU 4.5 10/14/2020    CALCIUM 9.0 10/15/2020    BILITOT 0.89 10/14/2020    ALKPHOS 67 10/14/2020    AST 19 10/14/2020    ALT 11 10/14/2020     BMP:    Lab Results   Component Value Date     10/15/2020    K 3.0 10/15/2020    CL 99 10/15/2020    CO2 23 10/15/2020    BUN 37 10/15/2020    LABALBU 4.5 10/14/2020    CREATININE 1.20 10/15/2020    CALCIUM 9.0 10/15/2020    GFRAA >60 10/15/2020    LABGLOM 60 10/15/2020    GLUCOSE 200 10/15/2020        Assessment:  Patient Active Problem List   Diagnosis    Altered mental status    Metabolic encephalopathy    Falling episodes    Type 2 diabetes mellitus with diabetic neuropathy, with long-term current use of insulin (HCC)    Nausea and vomiting    Severe malnutrition (HCC)     Diarrhea  Type 2 diabetes with hyperglycemia insulin treated  Type 2 diabetes with polyneuropathy  Proteinuria  Type 2 diabetes with renal complications  Acute kidney injury on top of CKD 2  Hypokalemia K supplements  Degenerative disc disease cervical spine  -Thoracic spine mass oncology consulted we will consult neurosurgery  Metabolic encephalopathy  Hypertension essential continue with present treatment  Plan:    Labs reviewed continue with before meals and at bedtime Accu-Cheks with insulin coverage appetite coming back encephalopathy getting better continue with physical therapy occupational therapy see orders  Avoid nephrotoxic drugs    John Velazco MD  6:41 PM

## 2020-10-15 NOTE — BRIEF OP NOTE
Brief Postoperative Note    Gaurav Smith  YOB: 1950  [id number]    Pre-operative Diagnosis: AMS    Post-operative Diagnosis: Same    Procedure: LP    Anesthesia: Local    Surgeons/Assistants: JEAN GUERRERO    Estimated Blood Loss: 0    Complications: None    Specimens: Was Obtained: 7.5mL clear CSF    Findings: successful LP    Electronically signed by Adeel Felder on 10/15/2020 at 3:53 PM

## 2020-10-15 NOTE — PROGRESS NOTES
Patient refusing lumbar puncture today citing that he has been taking Plavix and needs to be off for 3 days prior to LP. RN spoke with IR staff regarding patient's refusal and they stated that holding Plavix is no longer required for LP. RN informed the patient of this however he is still refusing. RN notified Dr. Juan Valente.

## 2020-10-15 NOTE — PROGRESS NOTES
Speech Language Pathology  Speech Language Pathology  [address] [facility]    Dysphagia Treatment Note    Date: 10/15/2020  Patients Name: Leo Badillo  MRN: [id number]  Diagnosis: dysphagia  Patient Active Problem List   Diagnosis Code    Altered mental status Z12.72    Metabolic encephalopathy C08.89    Falling episodes R29.6    Type 2 diabetes mellitus with diabetic neuropathy, with long-term current use of insulin ([address] E11.40, Z79.4    Nausea and vomiting R11.2    Severe malnutrition ([address] E43       Pain: 0/10    Dysphagia Treatment  Treatment time: [id number]    Subjective: [x] Alert [x] Cooperative     [] Confused     [] Agitated    [] Lethargic    Objective/Assessment:    Pt. Seen for diet tolerance. Pt. Completed bedside swallow study on 10/13. Regular with thin liquid was recommended. Pt. Edge of bed with breakfast tray. No s/s of aspiration with mixed consistency (cereal with milk), soft solids and thin liquid. RN present giving medication. No s/s of aspiration noted with medication. No difficulty with mastication noted. Pt. Provided with education re: safe swallow strategies. Pt. Verbalized understanding. No additional ST is warranted for dysphagia therapy at this time. Pt. To be d/c from [address] [place] [address] for dysphagia therapy. Plan:  [] Continue ST services    [x] Discharge from ST:        Discharge recommendations: [] Inpatient Rehab   [] [place]   [] Outpatient Therapy  [] Follow up at trauma clinic   [] Other:         Treatment completed by: Malgorzata Almanza M.A.  CCC-SLP

## 2020-10-15 NOTE — PLAN OF CARE
Problem: Falls - Risk of:  Goal: Will remain free from falls  Description: Will remain free from falls  Outcome: Ongoing     Problem: Falls - Risk of:  Goal: Absence of physical injury  Description: Absence of physical injury  Outcome: Ongoing     Problem: Skin Integrity:  Goal: Will show no infection signs and symptoms  Description: Will show no infection signs and symptoms  Outcome: Ongoing     Problem: Skin Integrity:  Goal: Absence of new skin breakdown  Description: Absence of new skin breakdown  Outcome: Ongoing     Problem: Cardiac:  Goal: Ability to maintain vital signs within normal range will improve  Description: Ability to maintain vital signs within normal range will improve  Outcome: Ongoing     Problem: Cardiac:  Goal: Cardiovascular alteration will improve  Description: Cardiovascular alteration will improve  Outcome: Ongoing     Problem: Nutrition  Goal: Optimal nutrition therapy  Outcome: Ongoing

## 2020-10-15 NOTE — PLAN OF CARE
Problem: Falls - Risk of:  Goal: Will remain free from falls  Description: Will remain free from falls  10/15/2020 1052 by Lisa Garcia RN  Outcome: Ongoing  Note: Room free of clutter  Hourly rounding   Non-skid socks worn  Side rails up x2  Bed low and locked  Call light in reach  Instructed to call out before getting out of bed  Anticipatory needs met  Bed alarm on  Falling star at the door and on wristband

## 2020-10-15 NOTE — PROGRESS NOTES
Physical Therapy  Facility/Department: [facility] PROGRESSIVE CARE  Daily Treatment Note  NAME: Corinne Redd  : 1950  MRN: [id number]    Date of Service: 10/15/2020    Discharge Recommendations:  Subacute/Skilled Nursing Facility, Continue to assess pending progress       RN reports patient is medically stable for therapy treatment this date. Chart reviewed prior to treatment and patient is agreeable for therapy. All lines intact and patient positioned comfortably at end of treatment. All patient needs addressed prior to ending therapy session. Assessment   Body structures, Functions, Activity limitations: Decreased endurance;Decreased strength;Decreased balance;Decreased safe awareness; Increased pain  Assessment: Pt tolerated PT session well, limited by cognitive status and fatigue this date, stating he felt lightheaded during gait training. Recommend SNF this date due to safety concerns, and to improve strength, balance, and functional mobility. Will continue to assess. Prognosis: Good  Clinical Presentation: evolving  PT Education: Transfer Training;Equipment;PT Role;Functional Mobility Training;Plan of Care;General Safety;Gait Training  REQUIRES PT FOLLOW UP: Yes  Activity Tolerance  Activity Tolerance: Patient limited by fatigue;Patient limited by cognitive status; Patient limited by endurance  Activity Tolerance: During ambulation, pt stated he felt lightheaded requiring pt to return to bed. Patient Diagnosis(es): The primary encounter diagnosis was Altered mental status, unspecified altered mental status type. A diagnosis of Nausea and vomiting, intractability of vomiting not specified, unspecified vomiting type was also pertinent to this visit. has a past medical history of CAD (coronary artery disease), Diabetes mellitus ([address] Headache, Hyperlipidemia, Hypertension, and Kidney stone.    has a past surgical history that includes Cardiac surgery and Cholecystectomy. Restrictions  Restrictions/Precautions  Restrictions/Precautions: Fall Risk, Up as Tolerated  Position Activity Restriction  Other position/activity restrictions: IV RUE, telemetry, alarms, up with assist  Subjective   General  Chart Reviewed: Yes  Response To Previous Treatment: Patient with no complaints from previous session. Family / Caregiver Present: Yes(s.oJonatan horta)  Subjective  Subjective: Pt states he is not in any pain          Cognition   Cognition  Overall Cognitive Status: Exceptions  Arousal/Alertness: Appropriate responses to stimuli  Following Commands: Follows all commands without difficulty  Attention Span: Appears intact  Memory: Decreased short term memory  Safety Judgement: Decreased awareness of need for assistance;Decreased awareness of need for safety  Problem Solving: Assistance required to generate solutions;Assistance required to identify errors made;Decreased awareness of errors  Insights: Decreased awareness of deficits  Initiation: Requires cues for some  Sequencing: Requires cues for some  Objective   Bed mobility  Sit to Supine: Stand by assistance  Scooting: Stand by assistance  Comment: HOB elevated, good use of handrails. Transfers  Sit to Stand: Contact guard assistance  Stand to sit: Contact guard assistance  Comment: Verbal cueing for proper hand placement. Ambulation  Ambulation?: Yes  Ambulation 1  Surface: level tile  Device: Rolling Walker; No Device  Assistance: Minimal assistance  Gait Deviations: Decreased step length;Decreased step height;Deviated path;Staggers  Distance: 15ft without AD, 120ft with RW  Comments: Pt and visitor stated pt is able to walk with RW and has been doing so in room. Pt demo'd unsteadiness and increased fall risk without AD. While ambulating without AD, pt would stagger and lose balance requiring Ritchie to correct. Pt also used railings on wall in hallways.  Pt then given RW, which he was more steady with but still required Ritchie due to occassional LOB. Educated pt and Felicita that pt use RW when in room or going for walks for safety. Demo'd good understanding. Balance  Posture: Good  Sitting - Static: Good  Sitting - Dynamic: Good  Standing - Static: Fair  Standing - Dynamic: Fair;-(BUE support from RW)                           OutComes Score   AM-PAC Score  AM-PAC Inpatient Mobility Raw Score : 19 (10/15/20 1033)  AM-PAC Inpatient T-Scale Score : 45.44 (10/15/20 1033)  Mobility Inpatient CMS 0-100% Score: 41.77 (10/15/20 1033)  Mobility Inpatient CMS G-Code Modifier : CK (10/15/20 1033)          Goals  Short term goals  Time Frame for Short term goals: 12 visits  Short term goal 1: Pt will perform bed mobility indep  Short term goal 2: Pt will transfer indep  Short term goal 3: Pt will ambulate 150ft with LRAD and SBA  Short term goal 4: Pt will tolerate 25mins of PT including therex, theract, and gait training to improve functional mobility and activity tolerance. Patient Goals   Patient goals : To go home    Plan    Plan  Times per week: 1-2x day / 5-6 days per week  Current Treatment Recommendations: Strengthening, Transfer Training, Endurance Training, Balance Training, Gait Training, Home Exercise Program, Functional Mobility Training, Safety Education & Training  Safety Devices  Type of devices:  All fall risk precautions in place, Bed alarm in place, Call light within reach, Gait belt, Nurse notified, Left in bed, Patient at risk for falls     Therapy Time   Individual Concurrent Group Co-treatment   Time In 1006         Time Out 1019         Minutes 13                 Mariela Escamilla, PT

## 2020-10-16 ENCOUNTER — APPOINTMENT (OUTPATIENT)
Dept: MRI IMAGING | Age: 70
DRG: 917 | End: 2020-10-16
Payer: MEDICARE

## 2020-10-16 LAB
ABSOLUTE EOS #: 0.18 K/UL (ref 0–0.44)
ABSOLUTE IMMATURE GRANULOCYTE: 0.06 K/UL (ref 0–0.3)
ABSOLUTE LYMPH #: 1.36 K/UL (ref 1.1–3.7)
ABSOLUTE MONO #: 0.84 K/UL (ref 0.1–1.2)
ANION GAP SERPL CALCULATED.3IONS-SCNC: 10 MMOL/L (ref 9–17)
ANION GAP SERPL CALCULATED.3IONS-SCNC: 11 MMOL/L (ref 9–17)
BASOPHILS # BLD: 0 % (ref 0–2)
BASOPHILS ABSOLUTE: 0.04 K/UL (ref 0–0.2)
BUN BLDV-MCNC: 30 MG/DL (ref 8–23)
BUN/CREAT BLD: 28 (ref 9–20)
CALCIUM SERPL-MCNC: 9.3 MG/DL (ref 8.6–10.4)
CHLORIDE BLD-SCNC: 98 MMOL/L (ref 98–107)
CHLORIDE BLD-SCNC: 99 MMOL/L (ref 98–107)
CO2: 22 MMOL/L (ref 20–31)
CO2: 23 MMOL/L (ref 20–31)
CREAT SERPL-MCNC: 1.08 MG/DL (ref 0.7–1.2)
DIFFERENTIAL TYPE: ABNORMAL
EOSINOPHILS RELATIVE PERCENT: 2 % (ref 1–4)
GFR AFRICAN AMERICAN: >60 ML/MIN
GFR NON-AFRICAN AMERICAN: >60 ML/MIN
GFR SERPL CREATININE-BSD FRML MDRD: ABNORMAL ML/MIN/{1.73_M2}
GFR SERPL CREATININE-BSD FRML MDRD: ABNORMAL ML/MIN/{1.73_M2}
GLUCOSE BLD-MCNC: 142 MG/DL (ref 75–110)
GLUCOSE BLD-MCNC: 142 MG/DL (ref 75–110)
GLUCOSE BLD-MCNC: 144 MG/DL (ref 75–110)
GLUCOSE BLD-MCNC: 172 MG/DL (ref 70–99)
HCT VFR BLD CALC: 40.7 % (ref 40.7–50.3)
HEMOGLOBIN: 14.1 G/DL (ref 13–17)
IMMATURE GRANULOCYTES: 1 %
LYMPHOCYTES # BLD: 12 % (ref 24–43)
MAGNESIUM: 2 MG/DL (ref 1.6–2.6)
MCH RBC QN AUTO: 30.4 PG (ref 25.2–33.5)
MCHC RBC AUTO-ENTMCNC: 34.6 G/DL (ref 28.4–34.8)
MCV RBC AUTO: 87.7 FL (ref 82.6–102.9)
MONOCYTES # BLD: 7 % (ref 3–12)
NRBC AUTOMATED: 0 PER 100 WBC
PDW BLD-RTO: 12.8 % (ref 11.8–14.4)
PHOSPHORUS: 2.6 MG/DL (ref 2.5–4.5)
PLATELET # BLD: 289 K/UL (ref 138–453)
PLATELET ESTIMATE: ABNORMAL
PMV BLD AUTO: 9.3 FL (ref 8.1–13.5)
POTASSIUM SERPL-SCNC: 2.6 MMOL/L (ref 3.7–5.3)
POTASSIUM SERPL-SCNC: 3.5 MMOL/L (ref 3.7–5.3)
RBC # BLD: 4.64 M/UL (ref 4.21–5.77)
RBC # BLD: ABNORMAL 10*6/UL
SEG NEUTROPHILS: 78 % (ref 36–65)
SEGMENTED NEUTROPHILS ABSOLUTE COUNT: 8.97 K/UL (ref 1.5–8.1)
SODIUM BLD-SCNC: 130 MMOL/L (ref 135–144)
SODIUM BLD-SCNC: 133 MMOL/L (ref 135–144)
WBC # BLD: 11.5 K/UL (ref 3.5–11.3)
WBC # BLD: ABNORMAL 10*3/UL

## 2020-10-16 PROCEDURE — 6370000000 HC RX 637 (ALT 250 FOR IP): Performed by: INTERNAL MEDICINE

## 2020-10-16 PROCEDURE — 6370000000 HC RX 637 (ALT 250 FOR IP): Performed by: NURSE PRACTITIONER

## 2020-10-16 PROCEDURE — 36415 COLL VENOUS BLD VENIPUNCTURE: CPT

## 2020-10-16 PROCEDURE — 82947 ASSAY GLUCOSE BLOOD QUANT: CPT

## 2020-10-16 PROCEDURE — 2580000003 HC RX 258: Performed by: INTERNAL MEDICINE

## 2020-10-16 PROCEDURE — 99232 SBSQ HOSP IP/OBS MODERATE 35: CPT | Performed by: INTERNAL MEDICINE

## 2020-10-16 PROCEDURE — 84100 ASSAY OF PHOSPHORUS: CPT

## 2020-10-16 PROCEDURE — 83735 ASSAY OF MAGNESIUM: CPT

## 2020-10-16 PROCEDURE — 97129 THER IVNTJ 1ST 15 MIN: CPT

## 2020-10-16 PROCEDURE — 6360000002 HC RX W HCPCS: Performed by: INTERNAL MEDICINE

## 2020-10-16 PROCEDURE — A9579 GAD-BASE MR CONTRAST NOS,1ML: HCPCS | Performed by: INTERNAL MEDICINE

## 2020-10-16 PROCEDURE — 99233 SBSQ HOSP IP/OBS HIGH 50: CPT | Performed by: PSYCHIATRY & NEUROLOGY

## 2020-10-16 PROCEDURE — 80051 ELECTROLYTE PANEL: CPT

## 2020-10-16 PROCEDURE — 85025 COMPLETE CBC W/AUTO DIFF WBC: CPT

## 2020-10-16 PROCEDURE — 80048 BASIC METABOLIC PNL TOTAL CA: CPT

## 2020-10-16 PROCEDURE — 6360000004 HC RX CONTRAST MEDICATION: Performed by: INTERNAL MEDICINE

## 2020-10-16 PROCEDURE — 72197 MRI PELVIS W/O & W/DYE: CPT

## 2020-10-16 PROCEDURE — 2060000000 HC ICU INTERMEDIATE R&B

## 2020-10-16 RX ORDER — POTASSIUM CHLORIDE 7.45 MG/ML
10 INJECTION INTRAVENOUS
Status: COMPLETED | OUTPATIENT
Start: 2020-10-16 | End: 2020-10-16

## 2020-10-16 RX ORDER — SODIUM CHLORIDE 0.9 % (FLUSH) 0.9 %
10 SYRINGE (ML) INJECTION
Status: COMPLETED | OUTPATIENT
Start: 2020-10-16 | End: 2020-10-16

## 2020-10-16 RX ORDER — POTASSIUM CHLORIDE 20 MEQ/1
20 TABLET, EXTENDED RELEASE ORAL ONCE
Status: DISCONTINUED | OUTPATIENT
Start: 2020-10-16 | End: 2020-10-16

## 2020-10-16 RX ADMIN — AMLODIPINE BESYLATE 5 MG: 5 TABLET ORAL at 08:21

## 2020-10-16 RX ADMIN — POTASSIUM CHLORIDE 10 MEQ: 7.46 INJECTION, SOLUTION INTRAVENOUS at 06:00

## 2020-10-16 RX ADMIN — INSULIN LISPRO 1 UNITS: 100 INJECTION, SOLUTION INTRAVENOUS; SUBCUTANEOUS at 08:22

## 2020-10-16 RX ADMIN — POTASSIUM CHLORIDE 10 MEQ: 7.46 INJECTION, SOLUTION INTRAVENOUS at 08:20

## 2020-10-16 RX ADMIN — POTASSIUM CHLORIDE 10 MEQ: 7.46 INJECTION, SOLUTION INTRAVENOUS at 07:02

## 2020-10-16 RX ADMIN — LORAZEPAM 0.5 MG: 0.5 TABLET ORAL at 14:32

## 2020-10-16 RX ADMIN — HYDRALAZINE HYDROCHLORIDE 10 MG: 20 INJECTION, SOLUTION INTRAMUSCULAR; INTRAVENOUS at 00:17

## 2020-10-16 RX ADMIN — GADOTERIDOL 12 ML: 279.3 INJECTION, SOLUTION INTRAVENOUS at 15:58

## 2020-10-16 RX ADMIN — POTASSIUM BICARBONATE 20 MEQ: 782 TABLET, EFFERVESCENT ORAL at 21:15

## 2020-10-16 RX ADMIN — AMITRIPTYLINE HYDROCHLORIDE 50 MG: 50 TABLET, FILM COATED ORAL at 08:21

## 2020-10-16 RX ADMIN — AMITRIPTYLINE HYDROCHLORIDE 50 MG: 50 TABLET, FILM COATED ORAL at 19:51

## 2020-10-16 RX ADMIN — LOSARTAN POTASSIUM 100 MG: 100 TABLET, FILM COATED ORAL at 08:22

## 2020-10-16 RX ADMIN — LEVOTHYROXINE SODIUM 25 MCG: 0.03 TABLET ORAL at 06:01

## 2020-10-16 RX ADMIN — HYDRALAZINE HYDROCHLORIDE 10 MG: 20 INJECTION, SOLUTION INTRAMUSCULAR; INTRAVENOUS at 06:18

## 2020-10-16 RX ADMIN — METOPROLOL TARTRATE 25 MG: 25 TABLET, FILM COATED ORAL at 08:21

## 2020-10-16 RX ADMIN — Medication 10 ML: at 15:58

## 2020-10-16 RX ADMIN — CLOPIDOGREL 75 MG: 75 TABLET, FILM COATED ORAL at 08:21

## 2020-10-16 RX ADMIN — FAMOTIDINE 20 MG: 20 TABLET ORAL at 08:22

## 2020-10-16 RX ADMIN — PREGABALIN 25 MG: 25 CAPSULE ORAL at 08:22

## 2020-10-16 RX ADMIN — FAMOTIDINE 20 MG: 20 TABLET ORAL at 19:50

## 2020-10-16 RX ADMIN — METOPROLOL TARTRATE 25 MG: 25 TABLET, FILM COATED ORAL at 19:50

## 2020-10-16 ASSESSMENT — PAIN DESCRIPTION - FREQUENCY: FREQUENCY: CONTINUOUS

## 2020-10-16 ASSESSMENT — PAIN DESCRIPTION - DESCRIPTORS: DESCRIPTORS: ACHING

## 2020-10-16 ASSESSMENT — PAIN DESCRIPTION - LOCATION: LOCATION: LEG

## 2020-10-16 ASSESSMENT — PAIN SCALES - GENERAL
PAINLEVEL_OUTOF10: 0
PAINLEVEL_OUTOF10: 4
PAINLEVEL_OUTOF10: 0

## 2020-10-16 ASSESSMENT — PAIN DESCRIPTION - PAIN TYPE: TYPE: CHRONIC PAIN

## 2020-10-16 ASSESSMENT — PAIN DESCRIPTION - ONSET: ONSET: ON-GOING

## 2020-10-16 ASSESSMENT — PAIN DESCRIPTION - PROGRESSION: CLINICAL_PROGRESSION: NOT CHANGED

## 2020-10-16 ASSESSMENT — PAIN DESCRIPTION - ORIENTATION: ORIENTATION: RIGHT;LEFT

## 2020-10-16 NOTE — PROGRESS NOTES
Reason for Consult: Proteinuria    Requesting Physician:  Gus Calvin MD    INTERVAL HISTORY:   Creatinine improving to 1.08.  K 3.5 after replacement this am.  SNA declined to 130. Taking very poor po. Unable to assess. Pt in MRI. 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 His work-up was remarkable for a creatinine of 1.46, baseline creatinine has been 0.9-1.1. His urine dipstick was remarkable for +2 proteinuria  He does have a history of diabetes for about 8 years usually well controlled  Has a history of hypertension the same period of time and it is usually well controlled  He is aware of having some kidney function abnormalities and proteinuria, he reported that couple of years ago he saw Dr. Misty Ghotra and he was told that his kidneys are not bad and that he was slightly dehydrated  His mental status showed significant improvement, still does not feel that he is back to normal yet but feels that he is able to remember everything well  Blood pressure has been elevated with max blood pressure of 212/86, most recent blood pressure was 145/61    Review Of Systems:   Unable to assess. Past Medical History:   Diagnosis Date    CAD (coronary artery disease)     Diabetes mellitus ([facility]     Headache     Hyperlipidemia     Hypertension     Kidney stone        Past Surgical History:   Procedure Laterality Date    CARDIAC SURGERY      CHOLECYSTECTOMY         Prior to Admission medications    Medication Sig Start Date End Date Taking? Authorizing Provider   olmesartan-amLODIPine-HCTZ (TRIBENZOR) 40-5-25 MG TABS Take 1 tablet by mouth daily    Yes Historical Provider, MD   [facility]olam [facility]) 1 MG tablet Take 1 mg by mouth daily. Yes Historical Provider, MD   pregabalin (LYRICA) 25 MG capsule Take 25 mg by mouth daily.    Yes Historical Provider, MD   Insulin Glargine, 1 Unit Dial, (TOUJEO SOLOSTAR) 300 UNIT/ML SOPN Inject 40 Units into the skin daily   Yes Historical Provider, MD   amitriptyline (ELAVIL) 50 MG tablet Take 50 mg by mouth 2 times daily   Yes Historical Provider, MD   oxyCODONE-acetaminophen (PERCOCET) 5-325 MG per tablet Take 1 tablet by mouth 2 times daily as needed for Pain.    Yes Historical Provider, MD   levothyroxine (SYNTHROID) 25 MCG tablet Take 25 mcg by mouth every morning (before breakfast)   Yes Historical Provider, MD   clopidogrel (PLAVIX) 75 MG tablet Take 75 mg by mouth daily   Yes Historical Provider, MD       Scheduled Meds:   losartan  100 mg Oral Daily    amLODIPine  5 mg Oral Daily    famotidine  20 mg Oral BID    insulin lispro  0-6 Units Subcutaneous TID WC    insulin lispro  0-3 Units Subcutaneous Nightly    potassium chloride  20 mEq Oral Once    metoprolol tartrate  25 mg Oral BID    amitriptyline  50 mg Oral BID    clopidogrel  75 mg Oral Daily    levothyroxine  25 mcg Oral QAM AC    pregabalin  25 mg Oral Daily    sodium chloride flush  10 mL Intravenous 2 times per day     Continuous Infusions:   dextrose      sodium chloride 75 mL/hr at 10/15/20 1848     PRN Meds:gadoteridol, sodium chloride flush, sodium chloride flush, iopamidol, hydrALAZINE, glucose, dextrose, glucagon (rDNA), dextrose, LORazepam, sodium chloride flush, acetaminophen **OR** acetaminophen, polyethylene glycol, promethazine **OR** ondansetron    No Known Allergies    Social History     Socioeconomic History    Marital status: Single     Spouse name: Not on file    Number of children: Not on file    Years of education: Not on file    Highest education level: Not on file   Occupational History    Not on file   Social Needs    Financial resource strain: Not on file    Food insecurity     Worry: Not on file     Inability: Not on file    Transportation needs     Medical: Not on file     Non-medical: Not on file   Tobacco Use    Smoking status: Never Smoker    Smokeless tobacco: Never Used   Substance and CREATININE 1.08 10/16/2020    CREATININE 1.20 10/15/2020    CREATININE 0.99 10/14/2020    GLUCOSE 172 (H) 10/16/2020    GLUCOSE 200 (H) 10/15/2020    GLUCOSE 121 (H) 10/14/2020     CMP:   Lab Results   Component Value Date     10/16/2020    K 3.5 10/16/2020    CL 98 10/16/2020    CO2 22 10/16/2020    BUN 30 10/16/2020    CREATININE 1.08 10/16/2020    GLUCOSE 172 10/16/2020    CALCIUM 9.3 10/16/2020    PROT 6.9 10/14/2020    LABALBU 4.5 10/14/2020    BILITOT 0.89 10/14/2020    ALKPHOS 67 10/14/2020    AST 19 10/14/2020    ALT 11 10/14/2020      Hepatic:   Lab Results   Component Value Date    AST 19 10/14/2020    AST 15 10/13/2020    AST 11 10/12/2020    ALT 11 10/14/2020    ALT 9 10/13/2020    ALT 8 10/12/2020    BILITOT 0.89 10/14/2020    BILITOT 0.82 10/13/2020    BILITOT 0.73 10/12/2020    ALKPHOS 67 10/14/2020    ALKPHOS 60 10/13/2020    ALKPHOS 65 10/12/2020     BNP: No results found for: BNP  Lipids: No results found for: CHOL, HDL  INR: No results found for: INR  PTH: No results found for: PTH  Phosphorus:    Lab Results   Component Value Date    PHOS 2.6 10/16/2020     Ionized Calcium: No results found for: IONCA  Magnesium:   Lab Results   Component Value Date    MG 2.0 10/16/2020     Albumin:   Lab Results   Component Value Date    LABALBU 4.5 10/14/2020     Last 3 CK, CKMB, Troponin: @LABRCNT(CKTOTAL:3,CKMB:3,TROPONINI:3)       URINE:)No results found for: [place]     Radiology:   Chest x-ray  1. No acute intrathoracic abnormality.  No evidence of intrathoracic    metastatic disease. 2. No acute intra-abdominal abnormality.  Hepatic steatosis. 3. Ill-defined sclerotic lesion in the right sacrum is indeterminate.  MRI    with and without contrast is recommended for further characterization.     4. Prior cholecystectomy.              Renal ultrasound  The right kidney measures 10.4 cm in length and the left kidney measures 10.5    cm in length.         Diffusely increased renal parenchymal echogenicity noted bilaterally,    suggestive of chronic medical renal disease.  No evidence for nephrolithiasis    or hydronephrosis.  There multiple cystic lesions within the upper pole of    the left kidney measuring 0.5 x 0.8 x 0.4 cm and 2.9 x 2.9 x 3.2 cm.               Assessment:  1. Acute kidney injury, caused by volume depletion, function is back to baseline  2. Proteinuria, type factorial secondary to diabetic nephropathy hypertensive nephrosclerosis  3. Mental status changes  4. Hypertension  5. Hypokalemia    Plan:  Creatinine improving to 1.08. Replace lytes PRN. Give additional 20mEq KCL today. urine protein/Cr ratio1. 68. Serum protein immunofixation was negative  Renal ultrasound was suggestive of chronic medical renal disease  Continue amlodipine, metoprolol. Olmesartan changed to losartan due to pharmacy protocol. SBP trending higher today 140-170. Off HCTZ. If the patient will continue to be at risk of volume depletion we recommend against using hydrochlorothiazide on the long-term  Follow up aldosterone and renin levels. Follow up ANCA. Monitor SNA. 1000mL fluid restriction. Encourage po intake and ONS. According to the patient he had full GN work-up done by Dr. Raphael Lo in the last couple of years, but will be no point of rechecking his labs. Please do not hesitate to contact us for any further questions/concerns. We will continue to follow along with you. Pt seen in collaboration with Dr. Mathew Aldana. Electronically signed by ANDRADE Cardenas CNP  on 10/16/2020 at 3:42 PM         Physician Addendum  I have seen and examined pt at bed side.    I reviewed and agree with CNP's note. I performed all key and critical portions of this evaluation.  I agree with the plan of care as noted above.     Electronically signed by Kadeem Smith MD on 10/16/20 4:05 PM

## 2020-10-16 NOTE — PLAN OF CARE
Problem: Falls - Risk of:  Goal: Will remain free from falls  Outcome: Ongoing     Problem: Skin Integrity:  Goal: Will show no infection signs and symptoms  Outcome: Ongoing     Problem: Cardiac:  Goal: Ability to maintain vital signs within normal range will improve  Outcome: Ongoing     Problem: Health Behavior:  Goal: Identification of resources available to assist in meeting health care needs will improve  Outcome: Ongoing     Problem: Physical Regulation:  Goal: Complications related to the disease process, condition or treatment will be avoided or minimized  Outcome: Ongoing     Problem: Nutrition  Goal: Optimal nutrition therapy  Outcome: Ongoing

## 2020-10-16 NOTE — CARE COORDINATION
Discharge planning    Met with patient and his significant other Felicita to follow up on plan of care. PT and OT has been recommending SNF on 10/15 and he did decline with ss and they gave home care list. Did discuss home care and they are declining that also. Felicita stated that patient has help in the home as needed. Patient just completed MRI.      HEM/ONC  IMPRESSION:   1. Para spinal mass  2. Sacral lesion  3. Muscle atrophy  4. neuropathy  5. Mental status changes      RECOMMENDATIONS:  1. We will await MRI  2. Will discuss with neurology  3. bx of the paraspinal mass might be difficult technically.

## 2020-10-16 NOTE — PROGRESS NOTES
diabetic neuropathy, with long-term current use of insulin (HCC)    Nausea and vomiting    Severe malnutrition (HCC)     Diarrhea.   Resolved  Recurrent falls  2 diabetes with hyperglycemia insulin treated  Type 2 diabetes with polyneuropathy  Proteinuria  Type 2 diabetes with renal complications  Acute kidney injury on top of CKD 2  Hypokalemia  Thoracic spine mass  Degenerative disc disease cervical spine  Metabolic encephalopathy  Essential hypertension  Drug screen positive opiates and cannabinoids  Plan:    Labs reviewed continue with present treatment avoid nephrotoxic drugs see orders continue with insulin coverage as needed before meals and at bedtime      Shari Galeazzi MD  7:47 PM

## 2020-10-16 NOTE — PROGRESS NOTES
Comprehensive Nutrition Assessment    Type and Reason for Visit:  Reassess    Nutrition Recommendations/Plan:   1. Continue Diabetic diet and Glucerna 2x daily. 2. Monitor PO intakes, weight and labs. Nutrition Assessment:  Patient eating poorly, consuming less than 25% of meals. However per MD note appetite coming back encephalopathy getting better . Patient has a medical history of type 2 diabetes, PARTHA on top of CKD-2 and thoracic mass. Noted potassium 2.6 low, receiving potassium replacement. Continue diabetic diet and Glucerna supplements as ordered. Malnutrition Assessment:  Malnutrition Status:  Severe malnutrition    Context:  Acute Illness     Findings of the 6 clinical characteristics of malnutrition:  Energy Intake:  7 - 50% or less of estimated energy requirements for 5 or more days  Weight Loss:  Unable to assess     Body Fat Loss:  7 - Moderate body fat loss Triceps   Muscle Mass Loss:  7 - Moderate muscle mass loss Thigh (quadraceps), Clavicles (pectoralis & deltoids)  Fluid Accumulation:  No significant fluid accumulation Extremities   Strength:  Not Performed    Estimated Daily Nutrient Needs:  Energy (kcal):  7844-9079 kcal based on 30-32 kcal/kg; Weight Used for Energy Requirements:  Current     Protein (g):  70-82 gm/kg based on 1.3-1.5 gm/kg; Weight Used for Protein Requirements:  Current          Nutrition Related Findings:  No edema. Bowel sounds active. Has loss of appetite 10/15. Reviewed labs/meds      Wounds:  None       Current Nutrition Therapies:    DIET CARB CONTROL;   Dietary Nutrition Supplements: Diabetic Oral Supplement    Anthropometric Measures:  · Height: 5' 9\" (175.3 cm)  · Current Body Weight: 120 lb (54.4 kg)   · Admission Body Weight: 120 lb (54.4 kg)    · Ideal Body Weight: 160 lbs; % Ideal Body Weight 75 %   · BMI: 17.7  · BMI Categories: Underweight (BMI less than 22) age over 72       Nutrition Diagnosis:   · Severe malnutrition related to inadequate protein-energy intake as evidenced by poor intake prior to admission, intake 0-25%, BMI, moderate muscle loss, moderate loss of subcutaneous fat(17.72)    Nutrition Interventions:   Food and/or Nutrient Delivery:  Continue Current Diet, Start Oral Nutrition Supplement  Nutrition Education/Counseling:  Education not indicated   Coordination of Nutrition Care:  Continued Inpatient Monitoring    Goals:  PO intakes are greater than 50% at meals       Nutrition Monitoring and Evaluation:   Food/Nutrient Intake Outcomes:  Food and Nutrient Intake, Supplement Intake  Physical Signs/Symptoms Outcomes:  Weight, Biochemical Data     Discharge Planning:    Continue current diet, Continue Oral Nutrition Supplement       Some areas of assessment may be incomplete due to COVID-19 precautions    Ana Paula Bergman RD, LD  Office phone [phone]

## 2020-10-16 NOTE — PROGRESS NOTES
[facility] ([facility]) Neurology Specialist  [address]  [place], [address] [facility]:  [phone] or [phone]  FAX:  [phone]            Brief history: Abhijit Pineda is a 69 y.o. old male admitted on 10/12/2020 with exacerbation of difficulty walking and thoracic region     Subjective: No new neurological events overnight.   The patient continues to report having pain and dysesthesia along muscle atrophy left lower extremity more than right     Objective: BP (!) 146/56   Pulse 51   Temp 98.2 °F (36.8 °C) (Oral)   Resp 16   Ht 5' 9\" (1.753 m)   Wt 120 lb (54.4 kg)   SpO2 97%   BMI 17.72 kg/m²       Medications:    losartan  100 mg Oral Daily    amLODIPine  5 mg Oral Daily    famotidine  20 mg Oral BID    insulin lispro  0-6 Units Subcutaneous TID WC    insulin lispro  0-3 Units Subcutaneous Nightly    potassium chloride  20 mEq Oral Once    metoprolol tartrate  25 mg Oral BID    amitriptyline  50 mg Oral BID    clopidogrel  75 mg Oral Daily    levothyroxine  25 mcg Oral QAM AC    pregabalin  25 mg Oral Daily    sodium chloride flush  10 mL Intravenous 2 times per day      General examination:    Head: Normocephalic, atraumatic  Eyes: Extraocular movements intact  Lungs: Respirations unlabored, chest wall no deformity  ENT: Normal external ear canals, no sinus tenderness  Heart: Regular rate rhythm  Abdomen: No masses, tenderness  Extremities: No cyanosis or edema, 2+ pulses  Skin: Intact, normal skin color    Neurological examination:    Mental status   Alert and oriented; intact memory with no confusion, speech or language problems; no hallucinations or delusions     Cranial nerves   II - visual fields intact to confrontation                                                III, IV, VI - extra-ocular muscles full: no pupillary defect; no KAYLA, no nystagmus, no ptosis   V - normal facial sensation                                                               VII - normal facial symmetry                                                             VIII - intact hearing                                                                             IX, X - symmetrical palate                                                                  XI - symmetrical shoulder shrug                                                       XII - midline tongue without atrophy or fasciculation     Motor function  Normal muscle bulk and tone; 5/5 proximal both lower extremities     Sensory function Intact to touch, pin, vibration, proprioception     Cerebellar Intact fine motor movement. No involuntary movements or tremors     Reflex function Intact 2+ DTR and symmetric. Negative Babinski     Gait                   able to ambulate with the help of walker       No results found for: LDLCALC, LDLCHOLESTEROL, LDLDIRECT  No components found for: CHLPL  No results found for: TRIG  No results found for: HDL  No results found for: LDLCALC  No results found for: LABVLDL  No results found for: LABA1C  No results found for: EAG  No results found for: [id number]   Neurological work up:  CT head  CTA head and neck  MRI brain   MRI thoracic spine 10/15/2016 with hyperintense enhancing multilobulated mass in the posterior paraspinal soft tissue on the left side T2-T4 vertebral bodies, extension towards left T3-T4 formula. Differential diagnosis includes neurofibroma versus nerve sheath tumor    CT chest 10/14/2020 with ill-defined sclerotic lesion in the right sacrum  2 D echo       Assessment Recommendations: The patient has proximal lower extremity weakness and pain left side more than right side along with newly found left T2-T4 paraspinal soft tissue mass, possible neurofibroma versus nerve sheath tumor  Sacral lesion, skull atrophy, peripheral neuropathy    Etiology for patient's proximal left lower extremity weakness is not clear.   Possible differential includes proximal radiculopathy versus chronic inflammatory neuropathy given patient's consistent protein elevation in CSF. He previously has had extensive work-up done at [facility] for similar symptoms and being seen at neurology clinic. I agree that he would need biopsy for his thoracic paraspinal mass    I will obtain the records from outside facility neurology    MRI pelvis is pending. Discussed with patient patient's wife. We will follow. This note is created with the assistance of a speech-recognition program. While intending to generate a document that actually reflects the content of the visit, the document can still have some errors including those of syntax and sound a- like substitutions which may escape proofreading. In such instances, actual meaning can be extrapolated by contextual derivation.

## 2020-10-16 NOTE — PROGRESS NOTES
Occupational Therapy  DATE: 10/16/2020    NAME: Zully Ortiz  MRN: [id number]   : 1950    Patient not seen this date for Occupational Therapy due to:  [] Blood transfusion in progress  [] Cancel by RN  [] Hemodialysis  [x]  Refusal by Patient-10/16 pt declined OT due to increased fatigue/wanting to rest and RN was notified; continue to follow and needs eval  [] Spine Precautions   [] Strict Bedrest  [] Surgery  [] Testing      [] Other        [] PT being discontinued at this time. Patient independent. No further needs. [] PT being discontinued at this time as the patient has been transferred to hospice care. No further needs.     Ayanna Hurtado, OT

## 2020-10-16 NOTE — PROGRESS NOTES
Speech Language Pathology  Speech Language Pathology  [address] of Cognition     Date: 10/16/2020  Patients Name: Kevin Ruvalcaba  MRN: [id number]  Diagnosis:   Patient Active Problem List   Diagnosis Code    Altered mental status R68.08    Metabolic encephalopathy H44.13    Falling episodes R29.6    Type 2 diabetes mellitus with diabetic neuropathy, with long-term current use of insulin ([facility] [address] E11.40, Z79.4    Nausea and vomiting R11.2    Severe malnutrition ([facility] [address] E43       Pain: 0/10    Cognitive Treatment    Treatment time: 2:16-2:35    Subjective: [x] Alert [x] Cooperative     [] Confused     [] Agitated    [] Lethargic    Objective/Assessment: Pt seen for re-evaluation of cognition d/t improvement in mentation since initial ST evaluation. Pt presents with no apparent cognitive deficits at this time. No dysarthria noted, no oral motor deficits. No further ST is recommended. Verbal education provided. Attention: maintained throughout session    Orientation: Pt A&Ox4    Recall:   Delayed recall, 3 units: 2/3 (increased to 3/3 with min verbal cues) , 3/3  Immediate recall, 3 units: 3/3, 3/3   Immediate recall, 5 units: 3/5, increased to 5/5 with repetition    Organization:   Verbal Sequencing: [facility]  Word Associations: [facility]    Problem Solving/Reasoning: [facility]  Similarities and Differences: Inductive reasoning: 3/3  Deductive Reasoning: 3/3  Divergent Namin units named in 30 seconds     Safety/Judgement: [facility]  Task Insight: 3/3   Thought Flexibility: 3/3    Other: Pt reports cognition is back to baseline. No further ST is recommended. ST to d/c pt at this time, available for re-consult as needed. Plan:  [] Continue ST services    [x] Discharge from ST:      Discharge recommendations: No therapy recommended at discharge.     Treatment completed by: Zeferino Morales M.S. CF-SLP

## 2020-10-16 NOTE — PROGRESS NOTES
Today's Date: 10/16/2020  Patient Name: Dereje West  Date of admission: 10/12/2020 11:02 AM  Patient's age: 69 y.o., 1950  Admission Dx: Altered mental status [R41.82]    Reason for Consult: management recommendations  Requesting Physician: Gus Calvin MD    CHIEF COMPLAINT:  Mental status changes       INTERIM HISTORY  Pt is seen and examined  Neurologically about he same, awaiting MRI results  Cytology from CSF prelim negative       HISTORY OF PRESENT ILLNESS:      The patient is a 69 y.o.  male who is admitted to the hospital for confusion, recurrent falls, nausea and vomiting. Pt is admitted. Workup   Brain MRI is negative. Ultrasound is negative  Doppler study LE, negative  Carotid studies , mild stenosis . MRI spine. T2-T4 paraspinal mass ,lobulated. CT scan : sclerotic lesion sacrum   Chemistry mild hypokalemia. Pt states he has hx of DM2, presented with left LE weakness , [facility]. He also has progressive gastroparesis and peripheral muscle atrophy in both hands, blamed on DM2. Pt was seen and examined by neurology. LP was done and some workup is back, so far non revealing. Girlfriend mentions a couple of episodes of irrational behavior      Past Medical History:   has a past medical history of CAD (coronary artery disease), Diabetes mellitus ([address] Headache, Hyperlipidemia, Hypertension, and Kidney stone. Past Surgical History:   has a past surgical history that includes Cardiac surgery and Cholecystectomy. Medications:    Reviewed in Epic     Allergies:  Patient has no known allergies. Social History:   reports that he has never smoked. He has never used smokeless tobacco. He reports current alcohol use. He reports that he does not use drugs. Family History: family history includes Cancer in his sister; Coronary Art Dis in his father; Heart Attack in his father; Mental Illness in his mother.   Sister had Sarcoma   REVIEW OF SYSTEMS: Constitutional: No fever or chills. No night sweats, no weight loss   Eyes: No eye discharge, double vision, or eye pain   HEENT: negative for sore mouth, sore throat, hoarseness and voice change   Respiratory: negative for cough , sputum, dyspnea, wheezing, hemoptysis, chest pain   Cardiovascular: negative for chest pain, dyspnea, palpitations, orthopnea, PND   Gastrointestinal: negative for nausea, vomiting, diarrhea, constipation, abdominal pain, Dysphagia, hematemesis and hematochezia   Genitourinary: negative for frequency, dysuria, nocturia, urinary incontinence, and hematuria   Integument: negative for rash, skin lesions, bruises. Hematologic/Lymphatic: negative for easy bruising, bleeding, lymphadenopathy, or petechiae   Endocrine: negative for heat or cold intolerance,weight changes, change in bowel habits and hair loss   Musculoskeletal: muscle weakness   Neurological: as discussed     PHYSICAL EXAM:      BP (!) 146/56   Pulse 51   Temp 98.2 °F (36.8 °C) (Oral)   Resp 16   Ht 5' 9\" (1.753 m)   Wt 120 lb (54.4 kg)   SpO2 97%   BMI 17.72 kg/m²    Temp (24hrs), Av °F (36.7 °C), Min:97.8 °F (36.6 °C), Max:98.3 °F (36.8 °C)    General appearance - well appearing, no in pain or distress , muscle atrophy as discussed.  involving the leg, hands small muscles   Mental status - alert and cooperative   Eyes - pupils equal and reactive, extraocular eye movements intact   Ears - bilateral TM's and external ear canals normal   Mouth - mucous membranes moist, pharynx normal without lesions   Neck - supple, no significant adenopathy   Lymphatics - no palpable lymphadenopathy, no hepatosplenomegaly   Chest - clear to auscultation, no wheezes, rales or rhonchi, symmetric air entry   Heart - normal rate, regular rhythm, normal S1, S2, no murmurs  Abdomen - soft, nontender, nondistended, no masses or organomegaly   Neurological - alert, oriented, normal speech, no focal findings or movement disorder noted

## 2020-10-16 NOTE — PROGRESS NOTES
Answering service states Dr. Chandni Velez does not come to [facility]. Dr Bogdan Sellers paged. Dr. Bogdan Sellers calls back and states he does not come to [address] either, but has writer read MRI results. States as long as there is no spinal cord compression and patient is doing well, that Dr. Chandni Velez will see patient outpatient. Phone number [phone] () left and patient can call Monday morning and have an appointment made for Tuesday 10/20/2020. Spouse, Felicita, updated of above. Patient asleep and wife requested writer not wake up.

## 2020-10-16 NOTE — CONSULTS
Today's Date: 10/15/2020  Patient Name: aKtie Veliz  Date of admission: 10/12/2020 11:02 AM  Patient's age: 69 y.o., 1950  Admission Dx: Altered mental status [R41.82]    Reason for Consult: management recommendations  Requesting Physician: Sarah Zavala MD    CHIEF COMPLAINT:  Mental status changes       History Obtained From:  patient    HISTORY OF PRESENT ILLNESS:      The patient is a 69 y.o.  male who is admitted to the hospital for confusion, recurrent falls, nausea and vomiting. Pt is admitted. Workup   Brain MRI is negative. Ultrasound is negative  Doppler study LE, negative  Carotid studies , mild stenosis . MRI spine. T2-T4 paraspinal mass ,lobulated. CT scan : sclerotic lesion sacrum   Chemistry mild hypokalemia. Pt states he has hx of DM2, presented with left LE weakness , was seen at [facility]. He also has progressive gastroparesis and peripheral muscle atrophy in both hands, blamed on DM2. Pt was seen and examined by neurology. LP was done and some workup is back, so far non revealing. Girlfriend mentions a couple of episodes of irrational behavior      Past Medical History:   has a past medical history of CAD (coronary artery disease), Diabetes mellitus ([address] Headache, Hyperlipidemia, Hypertension, and Kidney stone. Past Surgical History:   has a past surgical history that includes Cardiac surgery and Cholecystectomy. Medications:    Reviewed in Epic     Allergies:  Patient has no known allergies. Social History:   reports that he has never smoked. He has never used smokeless tobacco. He reports current alcohol use. He reports that he does not use drugs. Family History: family history includes Cancer in his sister; Coronary Art Dis in his father; Heart Attack in his father; Mental Illness in his mother. Sister had Sarcoma   REVIEW OF SYSTEMS:    Constitutional: No fever or chills.  No night sweats, no weight loss   Eyes: No eye discharge, double vision, or eye pain   HEENT: negative for sore mouth, sore throat, hoarseness and voice change   Respiratory: negative for cough , sputum, dyspnea, wheezing, hemoptysis, chest pain   Cardiovascular: negative for chest pain, dyspnea, palpitations, orthopnea, PND   Gastrointestinal: negative for nausea, vomiting, diarrhea, constipation, abdominal pain, Dysphagia, hematemesis and hematochezia   Genitourinary: negative for frequency, dysuria, nocturia, urinary incontinence, and hematuria   Integument: negative for rash, skin lesions, bruises. Hematologic/Lymphatic: negative for easy bruising, bleeding, lymphadenopathy, or petechiae   Endocrine: negative for heat or cold intolerance,weight changes, change in bowel habits and hair loss   Musculoskeletal: muscle weakness   Neurological: as discussed     PHYSICAL EXAM:      BP (!) 145/63   Pulse 58   Temp 97.9 °F (36.6 °C) (Oral)   Resp 16   Ht 5' 9\" (1.753 m)   Wt 120 lb (54.4 kg)   SpO2 97%   BMI 17.72 kg/m²    Temp (24hrs), Av.8 °F (36.6 °C), Min:97.3 °F (36.3 °C), Max:98.1 °F (36.7 °C)    General appearance - well appearing, no in pain or distress , muscle atrophy as discussed.  involving the leg, hands small muscles   Mental status - alert and cooperative   Eyes - pupils equal and reactive, extraocular eye movements intact   Ears - bilateral TM's and external ear canals normal   Mouth - mucous membranes moist, pharynx normal without lesions   Neck - supple, no significant adenopathy   Lymphatics - no palpable lymphadenopathy, no hepatosplenomegaly   Chest - clear to auscultation, no wheezes, rales or rhonchi, symmetric air entry   Heart - normal rate, regular rhythm, normal S1, S2, no murmurs  Abdomen - soft, nontender, nondistended, no masses or organomegaly   Neurological - alert, oriented, normal speech, no focal findings or movement disorder noted   Musculoskeletal - no joint tenderness, deformity or swelling   Extremities - peripheral pulses normal, no pedal edema, no clubbing or cyanosis   Skin - normal coloration and turgor, no rashes, no suspicious skin lesions noted ,    DATA:    Labs:   CBC:   Recent Labs     10/14/20  1122 10/15/20  1425   WBC 13.8* 10.4   HGB 14.4 13.4   HCT 43.7 39.4*    302     BMP:   Recent Labs     10/14/20  1122 10/15/20  1425    133*   K 3.2* 3.0*   CO2 20 23   BUN 33* 37*   CREATININE 0.99 1.20   LABGLOM >60 60*   GLUCOSE 121* 200*     PT/INR: No results for input(s): PROTIME, INR in the last 72 hours. IMAGING DATA:      Primary Problem  <principal problem not specified>    Active Hospital Problems    Diagnosis Date Noted    Severe malnutrition ([facility] [address] [E43] 31/31/3423    Metabolic encephalopathy [D85.68] 10/13/2020    Falling episodes [R29.6] 10/13/2020    Type 2 diabetes mellitus with diabetic neuropathy, with long-term current use of insulin ([facility] [address] [E11.40, Z79.4] 10/13/2020    Nausea and vomiting [R11.2]     Altered mental status [R41.82] 10/12/2020         IMPRESSION:   1. Para spinal mass  2. Sacral lesion  3. Muscle atrophy  4. neuropathy  5. Mental status changes     RECOMMENDATIONS:  1. We will await CSF cytology  2. Will discuss with neurology the possibility to link all his other neurological complaints, could he have one diagnosis that would explain his MS changes, muscle atrophy and gastroparesis   3. Await sacral MRI   4. Consider Bx of the para spinal mass. 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 Discussed with patient and Nurse. Thank you for asking us to see this patient.     RIGO CRESPO [facility] MD [place]  Hematologist/Medical Oncologist  Cell: [phone]

## 2020-10-16 NOTE — PLAN OF CARE
Problem: Falls - Risk of:  Goal: Will remain free from falls  Description: Will remain free from falls  10/16/2020 1303 by Khloe Goode RN  Outcome: Ongoing  Note: Siderails up x 2  Hourly rounding  Call light in reach  Instructed to call for assist before attempting out of bed.   Remains free from falls and accidental injury at this time   Floor free from obstacles  Bed is locked and in lowest position  Adequate lighting provided  Bed alarm on, Red Falling star and Stay with Me signs posted         Problem: Cardiac:  Goal: Ability to maintain vital signs within normal range will improve  Description: Ability to maintain vital signs within normal range will improve  10/16/2020 1303 by Khloe Goode RN  Outcome: Ongoing     Problem: Physical Regulation:  Goal: Complications related to the disease process, condition or treatment will be avoided or minimized  Description: Complications related to the disease process, condition or treatment will be avoided or minimized  10/16/2020 1303 by Khloe Goode RN  Outcome: Ongoing

## 2020-10-17 VITALS
HEIGHT: 69 IN | WEIGHT: 120 LBS | SYSTOLIC BLOOD PRESSURE: 146 MMHG | TEMPERATURE: 97.7 F | BODY MASS INDEX: 17.77 KG/M2 | OXYGEN SATURATION: 99 % | HEART RATE: 51 BPM | DIASTOLIC BLOOD PRESSURE: 61 MMHG | RESPIRATION RATE: 16 BRPM

## 2020-10-17 LAB
ABSOLUTE EOS #: 0.17 K/UL (ref 0–0.44)
ABSOLUTE IMMATURE GRANULOCYTE: 0.07 K/UL (ref 0–0.3)
ABSOLUTE LYMPH #: 1.29 K/UL (ref 1.1–3.7)
ABSOLUTE MONO #: 0.69 K/UL (ref 0.1–1.2)
ANION GAP SERPL CALCULATED.3IONS-SCNC: 9 MMOL/L (ref 9–17)
ANION GAP SERPL CALCULATED.3IONS-SCNC: 9 MMOL/L (ref 9–17)
BASOPHILS # BLD: 0 % (ref 0–2)
BASOPHILS ABSOLUTE: <0.03 K/UL (ref 0–0.2)
BUN BLDV-MCNC: 22 MG/DL (ref 8–23)
BUN/CREAT BLD: 23 (ref 9–20)
CALCIUM SERPL-MCNC: 8.7 MG/DL (ref 8.6–10.4)
CHLORIDE BLD-SCNC: 103 MMOL/L (ref 98–107)
CHLORIDE BLD-SCNC: 99 MMOL/L (ref 98–107)
CO2: 23 MMOL/L (ref 20–31)
CO2: 24 MMOL/L (ref 20–31)
CREAT SERPL-MCNC: 0.96 MG/DL (ref 0.7–1.2)
DIFFERENTIAL TYPE: ABNORMAL
EOSINOPHILS RELATIVE PERCENT: 2 % (ref 1–4)
GFR AFRICAN AMERICAN: >60 ML/MIN
GFR NON-AFRICAN AMERICAN: >60 ML/MIN
GFR SERPL CREATININE-BSD FRML MDRD: ABNORMAL ML/MIN/{1.73_M2}
GFR SERPL CREATININE-BSD FRML MDRD: ABNORMAL ML/MIN/{1.73_M2}
GLUCOSE BLD-MCNC: 153 MG/DL (ref 70–99)
GLUCOSE BLD-MCNC: 170 MG/DL (ref 75–110)
HCT VFR BLD CALC: 39.6 % (ref 40.7–50.3)
HEMOGLOBIN: 13.7 G/DL (ref 13–17)
IMMATURE GRANULOCYTES: 1 %
LYMPHOCYTES # BLD: 12 % (ref 24–43)
MAGNESIUM: 2 MG/DL (ref 1.6–2.6)
MCH RBC QN AUTO: 30.2 PG (ref 25.2–33.5)
MCHC RBC AUTO-ENTMCNC: 34.6 G/DL (ref 28.4–34.8)
MCV RBC AUTO: 87.4 FL (ref 82.6–102.9)
MONOCYTES # BLD: 7 % (ref 3–12)
NRBC AUTOMATED: 0 PER 100 WBC
PDW BLD-RTO: 12.7 % (ref 11.8–14.4)
PHOSPHORUS: 1.8 MG/DL (ref 2.5–4.5)
PLATELET # BLD: 238 K/UL (ref 138–453)
PLATELET ESTIMATE: ABNORMAL
PMV BLD AUTO: 9.1 FL (ref 8.1–13.5)
POTASSIUM SERPL-SCNC: 2.9 MMOL/L (ref 3.7–5.3)
POTASSIUM SERPL-SCNC: 3.9 MMOL/L (ref 3.7–5.3)
RBC # BLD: 4.53 M/UL (ref 4.21–5.77)
RBC # BLD: ABNORMAL 10*6/UL
SEG NEUTROPHILS: 78 % (ref 36–65)
SEGMENTED NEUTROPHILS ABSOLUTE COUNT: 8.24 K/UL (ref 1.5–8.1)
SODIUM BLD-SCNC: 132 MMOL/L (ref 135–144)
SODIUM BLD-SCNC: 135 MMOL/L (ref 135–144)
WBC # BLD: 10.5 K/UL (ref 3.5–11.3)
WBC # BLD: ABNORMAL 10*3/UL

## 2020-10-17 PROCEDURE — 6360000002 HC RX W HCPCS: Performed by: INTERNAL MEDICINE

## 2020-10-17 PROCEDURE — 82947 ASSAY GLUCOSE BLOOD QUANT: CPT

## 2020-10-17 PROCEDURE — 85025 COMPLETE CBC W/AUTO DIFF WBC: CPT

## 2020-10-17 PROCEDURE — 99233 SBSQ HOSP IP/OBS HIGH 50: CPT | Performed by: PSYCHIATRY & NEUROLOGY

## 2020-10-17 PROCEDURE — 80051 ELECTROLYTE PANEL: CPT

## 2020-10-17 PROCEDURE — 2580000003 HC RX 258: Performed by: INTERNAL MEDICINE

## 2020-10-17 PROCEDURE — 83735 ASSAY OF MAGNESIUM: CPT

## 2020-10-17 PROCEDURE — 97116 GAIT TRAINING THERAPY: CPT

## 2020-10-17 PROCEDURE — 6370000000 HC RX 637 (ALT 250 FOR IP): Performed by: NURSE PRACTITIONER

## 2020-10-17 PROCEDURE — 6370000000 HC RX 637 (ALT 250 FOR IP): Performed by: INTERNAL MEDICINE

## 2020-10-17 PROCEDURE — 36415 COLL VENOUS BLD VENIPUNCTURE: CPT

## 2020-10-17 PROCEDURE — 84100 ASSAY OF PHOSPHORUS: CPT

## 2020-10-17 PROCEDURE — 80048 BASIC METABOLIC PNL TOTAL CA: CPT

## 2020-10-17 PROCEDURE — 99232 SBSQ HOSP IP/OBS MODERATE 35: CPT | Performed by: INTERNAL MEDICINE

## 2020-10-17 PROCEDURE — 6360000002 HC RX W HCPCS: Performed by: NURSE PRACTITIONER

## 2020-10-17 RX ORDER — POTASSIUM CHLORIDE 7.45 MG/ML
10 INJECTION INTRAVENOUS
Status: COMPLETED | OUTPATIENT
Start: 2020-10-17 | End: 2020-10-17

## 2020-10-17 RX ORDER — AMLODIPINE BESYLATE 10 MG/1
10 TABLET ORAL DAILY
Status: DISCONTINUED | OUTPATIENT
Start: 2020-10-17 | End: 2020-10-17 | Stop reason: HOSPADM

## 2020-10-17 RX ORDER — POLYETHYLENE GLYCOL 3350 17 G/17G
17 POWDER, FOR SOLUTION ORAL DAILY PRN
Qty: 527 G | Refills: 0 | Status: SHIPPED | OUTPATIENT
Start: 2020-10-17 | End: 2020-11-16

## 2020-10-17 RX ORDER — POTASSIUM CHLORIDE 20 MEQ/1
20 TABLET, EXTENDED RELEASE ORAL
Status: DISCONTINUED | OUTPATIENT
Start: 2020-10-17 | End: 2020-10-17

## 2020-10-17 RX ADMIN — CLOPIDOGREL 75 MG: 75 TABLET, FILM COATED ORAL at 09:20

## 2020-10-17 RX ADMIN — METOPROLOL TARTRATE 25 MG: 25 TABLET, FILM COATED ORAL at 09:20

## 2020-10-17 RX ADMIN — POTASSIUM BICARBONATE 20 MEQ: 782 TABLET, EFFERVESCENT ORAL at 09:20

## 2020-10-17 RX ADMIN — LEVOTHYROXINE SODIUM 25 MCG: 0.03 TABLET ORAL at 05:38

## 2020-10-17 RX ADMIN — LOSARTAN POTASSIUM 100 MG: 100 TABLET, FILM COATED ORAL at 09:20

## 2020-10-17 RX ADMIN — POTASSIUM CHLORIDE 10 MEQ: 7.46 INJECTION, SOLUTION INTRAVENOUS at 09:21

## 2020-10-17 RX ADMIN — POTASSIUM CHLORIDE 10 MEQ: 7.46 INJECTION, SOLUTION INTRAVENOUS at 10:28

## 2020-10-17 RX ADMIN — HYDRALAZINE HYDROCHLORIDE 10 MG: 20 INJECTION, SOLUTION INTRAMUSCULAR; INTRAVENOUS at 03:55

## 2020-10-17 RX ADMIN — POTASSIUM CHLORIDE 10 MEQ: 7.46 INJECTION, SOLUTION INTRAVENOUS at 08:08

## 2020-10-17 RX ADMIN — POTASSIUM CHLORIDE 10 MEQ: 7.46 INJECTION, SOLUTION INTRAVENOUS at 11:23

## 2020-10-17 RX ADMIN — AMITRIPTYLINE HYDROCHLORIDE 50 MG: 50 TABLET, FILM COATED ORAL at 09:20

## 2020-10-17 RX ADMIN — AMLODIPINE BESYLATE 10 MG: 10 TABLET ORAL at 09:20

## 2020-10-17 RX ADMIN — PREGABALIN 25 MG: 25 CAPSULE ORAL at 09:20

## 2020-10-17 RX ADMIN — SODIUM CHLORIDE: 9 INJECTION, SOLUTION INTRAVENOUS at 12:26

## 2020-10-17 RX ADMIN — FAMOTIDINE 20 MG: 20 TABLET ORAL at 09:20

## 2020-10-17 ASSESSMENT — PAIN SCALES - GENERAL: PAINLEVEL_OUTOF10: 0

## 2020-10-17 NOTE — PLAN OF CARE
Problem: Skin Integrity:  Goal: Will show no infection signs and symptoms  Description: Will show no infection signs and symptoms  Outcome: Ongoing     Problem: Falls - Risk of:  Goal: Will remain free from falls  Description: Will remain free from falls  10/16/2020 2242 by Stan Bob RN  Outcome: Ongoing  1Fall risk assessment done, bed locked in low position, falling star in place, call light in reach at all times and encouraged to use for assist. Pathway to bathroom clutter-free and non skid socks on. Continue with hourly rounding, monitor for change. Pt remains free of falls/injury .  Pt using walker to aide with ambulation

## 2020-10-17 NOTE — PROGRESS NOTES
Today's Date: 10/17/2020  Patient Name: Theodora Mcdermott  Date of admission: 10/12/2020 11:02 AM  Patient's age: 69 y.o., 1950  Admission Dx: Altered mental status [R41.82]    Reason for Consult: management recommendations  Requesting Physician: Daryl Esipnoza MD    CHIEF COMPLAINT:  Mental status changes       INTERIM HISTORY  Patient was seen and examined. No events overnight. Continues to have generalized weakness. No significant changes. MRI results were reviewed and discussed. Possible biopsy on Tuesday. HISTORY OF PRESENT ILLNESS:      The patient is a 69 y.o.  male who is admitted to the hospital for confusion, recurrent falls, nausea and vomiting. Pt is admitted. Workup   Brain MRI is negative. Ultrasound is negative  Doppler study LE, negative  Carotid studies , mild stenosis . MRI spine. T2-T4 paraspinal mass ,lobulated. CT scan : sclerotic lesion sacrum   Chemistry mild hypokalemia. Pt states he has hx of DM2, presented with left LE weakness , was seen at [facility]. He also has progressive gastroparesis and peripheral muscle atrophy in both hands, blamed on DM2. Pt was seen and examined by neurology. LP was done and some workup is back, so far non revealing. Girlfriend mentions a couple of episodes of irrational behavior      Past Medical History:   has a past medical history of CAD (coronary artery disease), Diabetes mellitus ([address] Headache, Hyperlipidemia, Hypertension, and Kidney stone. Past Surgical History:   has a past surgical history that includes Cardiac surgery and Cholecystectomy. Medications:    Reviewed in Epic     Allergies:  Patient has no known allergies. Social History:   reports that he has never smoked. He has never used smokeless tobacco. He reports current alcohol use. He reports that he does not use drugs.      Family History: family history includes Cancer in his sister; Coronary Art Dis in his father; Heart Attack in his father; Mental Illness in his mother. Sister had Sarcoma   REVIEW OF SYSTEMS:    Constitutional: No fever or chills. No night sweats, no weight loss   Eyes: No eye discharge, double vision, or eye pain   HEENT: negative for sore mouth, sore throat, hoarseness and voice change   Respiratory: negative for cough , sputum, dyspnea, wheezing, hemoptysis, chest pain   Cardiovascular: negative for chest pain, dyspnea, palpitations, orthopnea, PND   Gastrointestinal: negative for nausea, vomiting, diarrhea, constipation, abdominal pain, Dysphagia, hematemesis and hematochezia   Genitourinary: negative for frequency, dysuria, nocturia, urinary incontinence, and hematuria   Integument: negative for rash, skin lesions, bruises. Hematologic/Lymphatic: negative for easy bruising, bleeding, lymphadenopathy, or petechiae   Endocrine: negative for heat or cold intolerance,weight changes, change in bowel habits and hair loss   Musculoskeletal: muscle weakness   Neurological: as discussed     PHYSICAL EXAM:      BP (!) 148/64   Pulse 62   Temp 98.4 °F (36.9 °C) (Oral)   Resp 18   Ht 5' 9\" (1.753 m)   Wt 120 lb (54.4 kg)   SpO2 99%   BMI 17.72 kg/m²    Temp (24hrs), Av °F (36.7 °C), Min:97.5 °F (36.4 °C), Max:98.4 °F (36.9 °C)    General appearance - well appearing, no in pain or distress , muscle atrophy as discussed.  involving the leg, hands small muscles   Mental status - alert and cooperative   Eyes - pupils equal and reactive, extraocular eye movements intact   Ears - bilateral TM's and external ear canals normal   Mouth - mucous membranes moist, pharynx normal without lesions   Neck - supple, no significant adenopathy   Lymphatics - no palpable lymphadenopathy, no hepatosplenomegaly   Chest - clear to auscultation, no wheezes, rales or rhonchi, symmetric air entry   Heart - normal rate, regular rhythm, normal S1, S2, no murmurs  Abdomen - soft, nontender, nondistended, no masses or organomegaly   Neurological - alert, oriented, normal speech, no focal findings or movement disorder noted   Musculoskeletal - no joint tenderness, deformity or swelling   Extremities - peripheral pulses normal, no pedal edema, no clubbing or cyanosis   Skin - normal coloration and turgor, no rashes, no suspicious skin lesions noted ,    DATA:    Labs:   CBC:   Recent Labs     10/16/20  0413 10/17/20  0620   WBC 11.5* 10.5   HGB 14.1 13.7   HCT 40.7 39.6*    238     BMP:   Recent Labs     10/16/20  0413 10/16/20  1014 10/17/20  0620   * 130* 135   K 2.6* 3.5* 2.9*   CO2 23 22 23   BUN 30*  --  22   CREATININE 1.08  --  0.96   LABGLOM >60  --  >60   GLUCOSE 172*  --  153*     PT/INR: No results for input(s): PROTIME, INR in the last 72 hours. IMAGING DATA:      Primary Problem  <principal problem not specified>    Active Hospital Problems    Diagnosis Date Noted    Severe malnutrition ([facility] [address] [E43] 57/91/0918    Metabolic encephalopathy [Q47.64] 10/13/2020    Falling episodes [R29.6] 10/13/2020    Type 2 diabetes mellitus with diabetic neuropathy, with long-term current use of insulin ([facility] [address] [E11.40, Z79.4] 10/13/2020    Nausea and vomiting [R11.2]     Altered mental status [R41.82] 10/12/2020     Paraspinal mass    IMPRESSION:   1. Para spinal mass  2. Sacral lesion  3. Muscle atrophy  4. neuropathy  5. Mental status changes     RECOMMENDATIONS:  1. Reviewed the results of the MRIs and discussed with the patient. MRI of the sacral area is less suspicious but concerned about the results of the MRI of the thoracic spine with findings as stated. Patient will need the biopsy. 2. He was evaluated by neurosurgery. He will have a biopsy on Tuesday. 3. Further recommendations will be based on results of biopsy. 4. Continue supportive care       Discussed with patient and Nurse.                             6 [facility]                          Cell: [phone]    This note is created with the assistance of a speech recognition program.  While intending to generate a document that actually reflects the content of the visit, the document can still have some errors including those of syntax and sound a like substitutions which may escape proof reading. It such instances, actual meaning can be extrapolated by contextual diversion.

## 2020-10-17 NOTE — PROGRESS NOTES
Surgical History:   Procedure Laterality Date    CARDIAC SURGERY      CHOLECYSTECTOMY         Prior to Admission medications    Medication Sig Start Date End Date Taking? Authorizing Provider   olmesartan-amLODIPine-HCTZ (TRIBENZOR) 40-5-25 MG TABS Take 1 tablet by mouth daily    Yes Historical Provider, MD   ALPRAZolam Idalia Basset) 1 MG tablet Take 1 mg by mouth daily. Yes Historical Provider, MD   pregabalin (LYRICA) 25 MG capsule Take 25 mg by mouth daily. Yes Historical Provider, MD   Insulin Glargine, 1 Unit Dial, (TOUJEO SOLOSTAR) 300 UNIT/ML SOPN Inject 40 Units into the skin daily   Yes Historical Provider, MD   amitriptyline (ELAVIL) 50 MG tablet Take 50 mg by mouth 2 times daily   Yes Historical Provider, MD   oxyCODONE-acetaminophen (PERCOCET) 5-325 MG per tablet Take 1 tablet by mouth 2 times daily as needed for Pain.    Yes Historical Provider, MD   levothyroxine (SYNTHROID) 25 MCG tablet Take 25 mcg by mouth every morning (before breakfast)   Yes Historical Provider, MD   clopidogrel (PLAVIX) 75 MG tablet Take 75 mg by mouth daily   Yes Historical Provider, MD       Scheduled Meds:   losartan  100 mg Oral Daily    amLODIPine  5 mg Oral Daily    famotidine  20 mg Oral BID    insulin lispro  0-6 Units Subcutaneous TID WC    insulin lispro  0-3 Units Subcutaneous Nightly    metoprolol tartrate  25 mg Oral BID    amitriptyline  50 mg Oral BID    clopidogrel  75 mg Oral Daily    levothyroxine  25 mcg Oral QAM AC    pregabalin  25 mg Oral Daily    sodium chloride flush  10 mL Intravenous 2 times per day     Continuous Infusions:   dextrose      sodium chloride 75 mL/hr at 10/15/20 1848     PRN Meds:sodium chloride flush, iopamidol, hydrALAZINE, glucose, dextrose, glucagon (rDNA), dextrose, LORazepam, sodium chloride flush, acetaminophen **OR** acetaminophen, polyethylene glycol, promethazine **OR** ondansetron    No Known Allergies    Social History     Socioeconomic History    Marital status: Single     Spouse name: Not on file    Number of children: Not on file    Years of education: Not on file    Highest education level: Not on file   Occupational History    Not on file   Social Needs    Financial resource strain: Not on file    Food insecurity     Worry: Not on file     Inability: Not on file    Transportation needs     Medical: Not on file     Non-medical: Not on file   Tobacco Use    Smoking status: Never Smoker    Smokeless tobacco: Never Used   Substance and Sexual Activity    Alcohol use: Yes     Comment: daily    Drug use: Never    Sexual activity: Not on file   Lifestyle    Physical activity     Days per week: Not on file     Minutes per session: Not on file    Stress: Not on file   Relationships    Social connections     Talks on phone: Not on file     Gets together: Not on file     Attends [employer] service: Not on file     Active member of club or organization: Not on file     Attends meetings of clubs or organizations: Not on file     Relationship status: Not on file    Intimate partner violence     Fear of current or ex partner: Not on file     Emotionally abused: Not on file     Physically abused: Not on file     Forced sexual activity: Not on file   Other Topics Concern    Not on file   Social History Narrative    Not on file       Family History   Problem Relation Age of Onset    Mental Illness Mother     Heart Attack Father     Coronary Art Dis Father     Cancer Sister          Physical Exam:  Vitals:    10/16/20 1931 10/17/20 0021 10/17/20 0344 10/17/20 0459   BP: (!) 151/70 (!) 156/59 (!) 174/66 (!) 150/56   Pulse: 66 53 57 60   Resp: 16 16 16    Temp: 97.5 °F (36.4 °C) 97.9 °F (36.6 °C) 97.9 °F (36.6 °C)    TempSrc: Oral Oral Oral    SpO2: 100% 97% 96%    Weight:       Height:         I/O last 3 completed shifts: In: 900 [I.V.:900]  Out: -     General:  Awake, alert, not in distress. Appears to be stated age. HEENT: Atraumatic, normocephalic.  Anicteric sclera. Pink and moist oral mucosa. Neck supple. No JVD. Chest: Bilateral air entry, clear to auscultation, no wheezing, rhonchi or rales. Cardiovascular: RRR, S1S2, no murmur, rub or gallop. No lower extremity edema. Abdomen: Soft, non tender to palpation. Musculoskeletal: No cyanosis or clubbing. Integumentary: Pink, warm and dry. Free from rash or lesions. CNS: Oriented to person, place and time. Speech clear. Face symmetrical. No tremor. Psych: Answering questions appropriately,.   Mood and affect      Data:  CBC:   Lab Results   Component Value Date    WBC 11.5 (H) 10/16/2020    HGB 14.1 10/16/2020    HCT 40.7 10/16/2020    MCV 87.7 10/16/2020     10/16/2020     BMP:    Lab Results   Component Value Date     (L) 10/16/2020     (L) 10/16/2020     (L) 10/15/2020    K 3.5 (L) 10/16/2020    K 2.6 (LL) 10/16/2020    K 3.0 (L) 10/15/2020    CL 98 10/16/2020    CL 99 10/16/2020    CL 99 10/15/2020    CO2 22 10/16/2020    CO2 23 10/16/2020    CO2 23 10/15/2020    BUN 30 (H) 10/16/2020    BUN 37 (H) 10/15/2020    BUN 33 (H) 10/14/2020    CREATININE 1.08 10/16/2020    CREATININE 1.20 10/15/2020    CREATININE 0.99 10/14/2020    GLUCOSE 172 (H) 10/16/2020    GLUCOSE 200 (H) 10/15/2020    GLUCOSE 121 (H) 10/14/2020     CMP:   Lab Results   Component Value Date     10/16/2020    K 3.5 10/16/2020    CL 98 10/16/2020    CO2 22 10/16/2020    BUN 30 10/16/2020    CREATININE 1.08 10/16/2020    GLUCOSE 172 10/16/2020    CALCIUM 9.3 10/16/2020    PROT 6.9 10/14/2020    LABALBU 4.5 10/14/2020    BILITOT 0.89 10/14/2020    ALKPHOS 67 10/14/2020    AST 19 10/14/2020    ALT 11 10/14/2020      Hepatic:   Lab Results   Component Value Date    AST 19 10/14/2020    AST 15 10/13/2020    AST 11 10/12/2020    ALT 11 10/14/2020    ALT 9 10/13/2020    ALT 8 10/12/2020    BILITOT 0.89 10/14/2020    BILITOT 0.82 10/13/2020    BILITOT 0.73 10/12/2020    ALKPHOS 67 10/14/2020    ALKPHOS 60 10/13/2020 ALKPHOS 65 10/12/2020     BNP: No results found for: BNP  Lipids: No results found for: CHOL, HDL  INR: No results found for: INR  PTH: No results found for: PTH  Phosphorus:    Lab Results   Component Value Date    PHOS 2.6 10/16/2020     Ionized Calcium: No results found for: IONCA  Magnesium:   Lab Results   Component Value Date    MG 2.0 10/16/2020     Albumin:   Lab Results   Component Value Date    LABALBU 4.5 10/14/2020     Last 3 CK, CKMB, Troponin: @LABRCNT(CKTOTAL:3,CKMB:3,TROPONINI:3)       URINE:)No results found for: Ana Mcclain     Radiology:   Chest x-ray  1. No acute intrathoracic abnormality.  No evidence of intrathoracic    metastatic disease. 2. No acute intra-abdominal abnormality.  Hepatic steatosis. 3. Ill-defined sclerotic lesion in the right sacrum is indeterminate.  MRI    with and without contrast is recommended for further characterization. 4. Prior cholecystectomy.              Renal ultrasound  The right kidney measures 10.4 cm in length and the left kidney measures 10.5    cm in length.         Diffusely increased renal parenchymal echogenicity noted bilaterally,    suggestive of chronic medical renal disease.  No evidence for nephrolithiasis    or hydronephrosis.  There multiple cystic lesions within the upper pole of    the left kidney measuring 0.5 x 0.8 x 0.4 cm and 2.9 x 2.9 x 3.2 cm.               Assessment:  1. Acute kidney injury, caused by volume depletion, function is back to baseline  2. Proteinuria, type factorial secondary to diabetic nephropathy hypertensive nephrosclerosis  3. Mental status changes  4. Hypertension  5. Hypokalemia    Plan:  Creatinine improving. Replace lytes PRN. Give 40mEq IV now start 20mEq po daily. Follow up Mg, phos levels. Urine protein/Cr ratio1. 68. Serum protein immunofixation was negative  Renal ultrasound was suggestive of chronic medical renal disease  Continue amlodipine, metoprolol.  Olmesartan changed to losartan due to pharmacy protocol. SBP trending. Increase norvasc to 10mg daily. Off HCTZ. If the patient will continue to be at risk of volume depletion we recommend against using hydrochlorothiazide on the long-term  Follow up aldosterone and renin levels. Follow up ANCA. Monitor SNA. 1000mL fluid restriction. Encourage po intake and ONS. MRI noted. According to the patient he had full GN work-up done by Dr. Gomez Merchant in the last couple of years, but will be no point of rechecking his labs. Please do not hesitate to contact us for any further questions/concerns. We will continue to follow along with you. Pt seen in collaboration with Dr. Annette Zavala.     Electronically signed by ANDRADE Morales CNP  on 10/17/2020 at 6:13 AM

## 2020-10-17 NOTE — PROGRESS NOTES
Physical Therapy  Facility/Department: [facility] PROGRESSIVE CARE  Daily Treatment Note  NAME: Nena Foreman  : 1950  MRN: [id number]    Date of Service: 10/17/2020    Discharge Recommendations:  Continue to assess pending progress, 24 hour supervision or assist, Home with assist PRN        Assessment   Body structures, Functions, Activity limitations: Decreased endurance;Decreased strength;Decreased balance;Decreased safe awareness; Increased pain  Assessment: pt progressing toward goals  Activity Tolerance  Activity Tolerance: Patient Tolerated treatment well     Patient Diagnosis(es): The primary encounter diagnosis was Altered mental status, unspecified altered mental status type. A diagnosis of Nausea and vomiting, intractability of vomiting not specified, unspecified vomiting type was also pertinent to this visit. has a past medical history of CAD (coronary artery disease), Diabetes mellitus ([facility] [address] Headache, Hyperlipidemia, Hypertension, and Kidney stone. has a past surgical history that includes Cardiac surgery and Cholecystectomy.     Restrictions  Restrictions/Precautions  Restrictions/Precautions: Fall Risk, Up as Tolerated  Position Activity Restriction  Other position/activity restrictions: IV RUE, telemetry, alarms, up with assist  Subjective   General  Chart Reviewed: Yes  Subjective  Subjective: pt pleasant and agreeble to PT  Pain Screening  Patient Currently in Pain: Denies  Vital Signs  Patient Currently in Pain: Denies       Orientation     Cognition      Objective   Bed mobility  Scooting: Stand by assistance  Transfers  Sit to Stand: Contact guard assistance  Stand to sit: Contact guard assistance  Stand Pivot Transfers: Contact guard assistance  Ambulation  Ambulation?: Yes  Ambulation 1  Surface: level tile  Device: Rolling Walker  Assistance: Contact guard assistance  Quality of Gait: steady  Gait Deviations: Decreased step length;Decreased step height;Deviated path  Distance: 150 ft Balance  Posture: Good  Sitting - Static: Good  Sitting - Dynamic: Good  Standing - Static: Fair  Exercises  Comments: pt declined                        G-Code     OutComes Score                                                     AM-PAC Score  AM-PAC Inpatient Mobility Raw Score : 20 (10/17/20 1609)  AM-PAC Inpatient T-Scale Score : 47.67 (10/17/20 1609)  Mobility Inpatient CMS 0-100% Score: 35.83 (10/17/20 1609)  Mobility Inpatient CMS G-Code Modifier : CJ (10/17/20 1609)          Goals  Short term goals  Time Frame for Short term goals: 12 visits  Short term goal 1: Pt will perform bed mobility indep  Short term goal 2: Pt will transfer indep  Short term goal 3: Pt will ambulate 150ft with LRAD and SBA  Short term goal 4: Pt will tolerate 25mins of PT including therex, theract, and gait training to improve functional mobility and activity tolerance. Patient Goals   Patient goals : To go home    Plan    Plan  Times per week: 1-2x day / 5-6 days per week  Current Treatment Recommendations: Strengthening, Transfer Training, Endurance Training, Balance Training, Gait Training, Home Exercise Program, Functional Mobility Training, Safety Education & Training  Safety Devices  Type of devices:  All fall risk precautions in place, Bed alarm in place, Call light within reach, Gait belt, Nurse notified, Left in bed, Patient at risk for falls     Therapy Time   Individual Concurrent Group Co-treatment   Time In  1331         Time Out  1345         Minutes  14                 REINALDO RIVERA, PTA

## 2020-10-17 NOTE — PROGRESS NOTES
Subjective:    Type 2 diabetes  No polyuria no polydipsia no hypoglycemia blood sugars reviewed  ROS  Fever no chills no GI/ complaints no cardiopulmonary complaints no TIA no bleeding no headache no sore throat no skin lesions no fatigue  physical exam  General Appearance: alert and oriented to person, place and time and in no acute distress  Skin: warm and dry, no rash or erythema  Head: normocephalic and atraumatic  Eyes: pupils equal, round, and reactive to light, conjunctivae normal and sclera anicteric  Neck: neck supple and non tender without mass   Pulmonary/Chest: clear to auscultation bilaterally- no wheezes, rales or rhonchi, normal air movement, no respiratory distress  Cardiovascular: normal rate, regular rhythm, normal S1 and S2, no gallops, intact distal pulses and no carotid bruits  Abdomen: soft, non-tender, non-distended, normal bowel sounds, no masses or organomegaly  Extremities: no edema and has no Homans' sign  Neurologic: Oriented X3 mild weakness left leg    BP (!) 146/61   Pulse 51   Temp 97.7 °F (36.5 °C) (Oral)   Resp 16   Ht 5' 9\" (1.753 m)   Wt 120 lb (54.4 kg)   SpO2 99%   BMI 17.72 kg/m²     CBC:   Lab Results   Component Value Date    WBC 10.5 10/17/2020    RBC 4.53 10/17/2020    HGB 13.7 10/17/2020    HCT 39.6 10/17/2020    MCV 87.4 10/17/2020    MCH 30.2 10/17/2020    MCHC 34.6 10/17/2020    RDW 12.7 10/17/2020     10/17/2020    MPV 9.1 10/17/2020     BMP:    Lab Results   Component Value Date     10/17/2020    K 3.9 10/17/2020    CL 99 10/17/2020    CO2 24 10/17/2020    BUN 22 10/17/2020    LABALBU 4.5 10/14/2020    CREATININE 0.96 10/17/2020    CALCIUM 8.7 10/17/2020    GFRAA >60 10/17/2020    LABGLOM >60 10/17/2020    GLUCOSE 153 10/17/2020        Assessment:  Patient Active Problem List   Diagnosis    Altered mental status    Metabolic encephalopathy    Falling episodes    Type 2 diabetes mellitus with diabetic neuropathy, with long-term current use of insulin (HCC)    Nausea and vomiting    Severe malnutrition (HCC)     Paraspinal mass  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   diarrheaResolved  reCurrent falls likely secondary to the diabetic neuropathy plus medications  Type 2 diabetes with hyperglycemia insulin treated  Type 2 diabetes with polyneuropathy  Type 2 diabetes with CKD 2  Acute kidney injury on top of CKD 2  Degenerative disc disease cervical spine  Metabolic encephalopathy resolved  Essential hypertension  Drug screen positive for opiates and cannabinoids advised to stop the cannabinoids  hypoKalemia  Plan:    Labs reviewed patient wants to go home today refused any physical therapy at home or visiting home nurse advised to use walker, have outpatient physical therapy follow-up with his PCP at his neurologist he will be seeing the neurosurgeon on Tuesday hopefully he will be arranged to have biopsy of the paraspinal tumor      Ajith Hilario MD  3:47 PM

## 2020-10-17 NOTE — PLAN OF CARE
Problem: Falls - Risk of:  Goal: Will remain free from falls  Description: Will remain free from falls  Outcome: Ongoing   Patient is fall risk per fall scale. Falling star on door. Fall sticker on armband. Hourly rounding performed. Personal belongings and call light within reach. Bed in low position. Restraint alternatives in place. Problem: Skin Integrity:  Goal: Will show no infection signs and symptoms  Description: Will show no infection signs and symptoms  Outcome: Ongoing   Skin No rashes or nodules noted. . Mucus membranes moist. Patient turned and repositioned as needed. Heels elevated as needed. Dressings changed as needed and per orders. Continue to monitor skin for breakdown.

## 2020-10-17 NOTE — PROGRESS NOTES
[facility] ([facility]) Neurology Specialist  [address]  [facility], [address] [facility]:  [phone] or [phone]  FAX:  [phone]            Brief history: Nena Foreman is a 69 y.o. old male admitted on 10/12/2020 with exacerbation of difficulty walking and thoracic region     Subjective: No new neurological events overnight. The patient denies having any new complaints.   He wants to go home today     Objective: BP (!) 146/61   Pulse 51   Temp 97.7 °F (36.5 °C) (Oral)   Resp 16   Ht 5' 9\" (1.753 m)   Wt 120 lb (54.4 kg)   SpO2 99%   BMI 17.72 kg/m²       Medications:    amLODIPine  10 mg Oral Daily    losartan  100 mg Oral Daily    famotidine  20 mg Oral BID    insulin lispro  0-6 Units Subcutaneous TID WC    insulin lispro  0-3 Units Subcutaneous Nightly    metoprolol tartrate  25 mg Oral BID    amitriptyline  50 mg Oral BID    clopidogrel  75 mg Oral Daily    levothyroxine  25 mcg Oral QAM AC    pregabalin  25 mg Oral Daily    sodium chloride flush  10 mL Intravenous 2 times per day      General examination:    Head: Normocephalic, atraumatic  Eyes: Extraocular movements intact  Lungs: Respirations unlabored, chest wall no deformity  ENT: Normal external ear canals, no sinus tenderness  Heart: Regular rate rhythm  Abdomen: No masses, tenderness  Extremities: No cyanosis or edema, 2+ pulses  Skin: Intact, normal skin color    Neurological examination:    Mental status   Alert and oriented; intact memory with no confusion, speech or language problems; no hallucinations or delusions     Cranial nerves   II - visual fields intact to confrontation                                                III, IV, VI - extra-ocular muscles full: no pupillary defect; no KAYLA, no nystagmus, no ptosis   V - normal facial sensation                                                               VII - normal facial symmetry                                                             VIII - intact hearing IX, X - symmetrical palate                                                                  XI - symmetrical shoulder shrug                                                       XII - midline tongue without atrophy or fasciculation     Motor function  Normal muscle bulk and tone; 5/5 proximal both lower extremities     Sensory function Intact to touch, pin, vibration, proprioception     Cerebellar Intact fine motor movement. No involuntary movements or tremors     Reflex function Intact 2+ DTR and symmetric. Negative Babinski     Gait                   able to ambulate with the help of walker       No results found for: LDLCALC, LDLCHOLESTEROL, LDLDIRECT  No components found for: CHLPL  No results found for: TRIG  No results found for: HDL  No results found for: LDLCALC  No results found for: LABVLDL  No results found for: LABA1C  No results found for: EAG  No results found for: [id number]   Neurological work up:  CT head  CTA head and neck  MRI brain   MRI thoracic spine 10/15/2016 with hyperintense enhancing multilobulated mass in the posterior paraspinal soft tissue on the left side T2-T4 vertebral bodies, extension towards left T3-T4 formula. 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 At that time the findings were consistent with mixed axonal and demyelinating peripheral neuropathy.   Differential diagnosis includes diabetic

## 2020-10-18 LAB
ALDOSTERONE COMMENT: NORMAL
ALDOSTERONE: 4.6 NG/DL
CULTURE: ABNORMAL
DIRECT EXAM: ABNORMAL
Lab: ABNORMAL
SPECIMEN DESCRIPTION: ABNORMAL

## 2020-10-18 NOTE — DISCHARGE SUMMARY
Physician Discharge Summary     Patient ID:  Gemma Mcdermott  [id number]  32 y.o.  1950    Admit date: 10/12/2020    Discharge date and time: 10/17/2020    Admission Diagnoses:   Patient Active Problem List   Diagnosis    Altered mental status    Metabolic encephalopathy    Falling episodes    Type 2 diabetes mellitus with diabetic neuropathy, with long-term current use of insulin (HCC)    Nausea and vomiting    Severe malnutrition (HCC)       Discharge Diagnoses:   Paraspinal mass  Recurrent falls  Type 2 diabetes with hyperglycemia insulin treated  Type 2 diabetes with renal complications  Type 2 diabetes with polyneuropathy  Acute kidney injury on top of CKD 2  Degenerative disc disease cervical spine  Metabolic encephalopathy  Essential hypertension  Drug screen positive for opiates and cocaine reports  Elevated troponin   hypokalemia  Consults: cardiology, nephrology, hematology/oncology and neurology    Procedures: none  Hospital Course: 23-year-old gentleman came in with altered mental status was found to have acute kidney injury on admission was complaining with diarrhea and weakness in the left leg, IV fluids physical therapy occupational therapy renal ultrasound protein electrophoresis were all done, mild elevation of troponins were noted was seen by cardiology deemed as a nonspecific patient progressively got better, his urine tox screen showed opiates and cannabinoids were taken for chronic back pain, patient was discharged home stable improved condition advised to stop hydrochlorothiazide try to avoid cannabinoids, advised to have outpatient physical therapy up with a walker, to see his neurologist for possibility of CIDP and to see the neurosurgeon on Tuesday for scheduling biopsy of the paraspinal tumor    Discharge Exam:  See progress note from today    Disposition: home  stAble improved  Patient Instructions:   Discharge Medication List as of 10/17/2020  4:19 PM      START taking these medications    Details   metoprolol tartrate (LOPRESSOR) 25 MG tablet Take 1 tablet by mouth 2 times daily, Disp-60 tablet,R-0Print      polyethylene glycol (GLYCOLAX) 17 g packet Take 17 g by mouth daily as needed for Constipation, Disp-527 g,R-0Print         CONTINUE these medications which have NOT CHANGED    Details   olmesartan-amLODIPine-HCTZ (TRIBENZOR) 40-5-25 MG TABS Take 1 tablet by mouth daily Historical Med      pregabalin (LYRICA) 25 MG capsule Take 25 mg by mouth daily. Historical Med      Insulin Glargine, 1 Unit Dial, (TOUJEO SOLOSTAR) 300 UNIT/ML SOPN Inject 40 Units into the skin dailyHistorical Med      amitriptyline (ELAVIL) 50 MG tablet Take 50 mg by mouth 2 times dailyHistorical Med      oxyCODONE-acetaminophen (PERCOCET) 5-325 MG per tablet Take 1 tablet by mouth 2 times daily as needed for Pain. Historical Med      levothyroxine (SYNTHROID) 25 MCG tablet Take 25 mcg by mouth every morning (before breakfast)Historical Med      clopidogrel (PLAVIX) 75 MG tablet Take 75 mg by mouth dailyHistorical Med         STOP taking these medications       ALPRAZolam (XANAX) 1 MG tablet Comments:   Reason for Stopping:             Activity: upWith a walker avoid driving until cleared by neurosurgery  Diet: No added salt diabetic diet  Follow-up with pCP in 1 week. Neurosurgery on Tuesday and hopefully biopsy of the paraspinal tumor this week by IR to be scheduled, will need outpatient physical therapy arranged by his PCP copy to :   Talita James MD  10/18/2020  2:41 PM

## 2020-10-19 LAB
-: NORMAL
REASON FOR REJECTION: NORMAL
ZZ NTE CLEAN UP: ORDERED TEST: NORMAL
ZZ NTE WITH NAME CLEAN UP: SPECIMEN SOURCE: NORMAL

## 2020-10-20 LAB
ANCA MYELOPEROXIDASE: 7 AU/ML
ANCA PROTEINASE 3: 1 AU/ML

## 2020-10-28 NOTE — PROGRESS NOTES
Physician Progress Note      PATIENT:               Shasha Boykin  CSN #:                  [id number]  :                       1950  ADMIT DATE:       10/12/2020 11:02 AM  [address] DATE:        10/17/2020 3:46 PM  RESPONDING  PROVIDER #:        Andreia Baez MD          QUERY TEXT:    Pt admitted with  a change of mental status. This was noted to be metabolic   encephalopathy. The patient had an acute kidney injury with creatinine   improving from 1.46 to 0.96. Drug screen was  positive for opiates and cannabis. . If possible, please document in progress   notes and discharge summary the relationship, if any, between Metabolic   Encephalopathy  due to  d/t PARTHA and/or Metabolic Encephalopathy d/t due to   cannabis and opiates    The medical record reflects the following:  Risk Factors: age 68 yo  Clinical Indicators: The patient presented with a change of mental status. This   was noted to be  metabolic encephalopathy. The patient also has PARTHA. Creatinine levels were   1.46 to 0.96. Drug screen was  positive for opiates and cannabis.   Treatment: Drug screen,  IVF, CSF cytology, Neuro consult,  Thank you,  Monika Moody, JOHNATHON, CDS  Options provided:  -- Metabolic Encephalopathy  due to  PARTHA  -- Metabolic encephalopathy due to cannabis and opiates  -- Metabolic encephalopathy due to both PARTHA and Cannabis and opiates  -- Other - I will add my own diagnosis  -- Disagree - Not applicable / Not valid  -- Disagree - Clinically unable to determine / Unknown  -- Refer to Clinical Documentation Reviewer    PROVIDER RESPONSE TEXT:    This patient has Metabolic encephalopathy due to both PARTHA and Cannabis and   opiates    Query created by: Ellie Alfonso on 10/27/2020 11:33 AM      Electronically signed by:  Andreia Baez MD 10/28/2020 4:03 PM

## 2022-02-12 DIAGNOSIS — U07.1 COVID-19: ICD-10-CM

## 2022-02-12 RX ORDER — DIPHENHYDRAMINE HYDROCHLORIDE 50 MG/ML
50 INJECTION INTRAMUSCULAR; INTRAVENOUS
OUTPATIENT
Start: 2022-02-14

## 2022-02-12 RX ORDER — SODIUM CHLORIDE 9 MG/ML
INJECTION, SOLUTION INTRAVENOUS CONTINUOUS
OUTPATIENT
Start: 2022-02-14

## 2022-02-12 RX ORDER — EPINEPHRINE 1 MG/ML
0.3 INJECTION, SOLUTION, CONCENTRATE INTRAVENOUS PRN
OUTPATIENT
Start: 2022-02-14

## 2022-02-12 RX ORDER — ACETAMINOPHEN 325 MG/1
650 TABLET ORAL
OUTPATIENT
Start: 2022-02-14

## 2022-02-12 RX ORDER — ALBUTEROL SULFATE 90 UG/1
4 AEROSOL, METERED RESPIRATORY (INHALATION) PRN
OUTPATIENT
Start: 2022-02-14

## 2022-02-12 RX ORDER — ONDANSETRON 2 MG/ML
8 INJECTION INTRAMUSCULAR; INTRAVENOUS
OUTPATIENT
Start: 2022-02-14

## 2022-02-12 RX ORDER — HEPARIN SODIUM (PORCINE) LOCK FLUSH IV SOLN 100 UNIT/ML 100 UNIT/ML
500 SOLUTION INTRAVENOUS PRN
OUTPATIENT
Start: 2022-02-14

## 2022-02-12 RX ORDER — SODIUM CHLORIDE 0.9 % (FLUSH) 0.9 %
5-40 SYRINGE (ML) INJECTION PRN
OUTPATIENT
Start: 2022-02-14

## 2022-02-12 RX ORDER — SODIUM CHLORIDE 9 MG/ML
25 INJECTION, SOLUTION INTRAVENOUS PRN
OUTPATIENT
Start: 2022-02-14

## 2022-02-14 ENCOUNTER — HOSPITAL ENCOUNTER (OUTPATIENT)
Dept: INFUSION THERAPY | Age: 72
End: 2022-02-14
Attending: INTERNAL MEDICINE | Admitting: INTERNAL MEDICINE
Payer: MEDICARE

## 2022-02-14 VITALS
SYSTOLIC BLOOD PRESSURE: 164 MMHG | TEMPERATURE: 97.9 F | DIASTOLIC BLOOD PRESSURE: 74 MMHG | OXYGEN SATURATION: 97 % | HEART RATE: 67 BPM | RESPIRATION RATE: 16 BRPM

## 2022-02-14 DIAGNOSIS — U07.1 COVID-19: Primary | ICD-10-CM

## 2022-02-14 PROCEDURE — 96365 THER/PROPH/DIAG IV INF INIT: CPT

## 2022-02-14 PROCEDURE — 6360000002 HC RX W HCPCS: Performed by: INTERNAL MEDICINE

## 2022-02-14 PROCEDURE — M0247 HC SOTROVIMAB INFUSION: HCPCS

## 2022-02-14 RX ORDER — SODIUM CHLORIDE 9 MG/ML
INJECTION, SOLUTION INTRAVENOUS CONTINUOUS
OUTPATIENT
Start: 2022-02-14

## 2022-02-14 RX ORDER — ALBUTEROL SULFATE 90 UG/1
4 AEROSOL, METERED RESPIRATORY (INHALATION) PRN
OUTPATIENT
Start: 2022-02-14

## 2022-02-14 RX ORDER — SODIUM CHLORIDE 0.9 % (FLUSH) 0.9 %
5-40 SYRINGE (ML) INJECTION PRN
OUTPATIENT
Start: 2022-02-14

## 2022-02-14 RX ORDER — EPINEPHRINE 1 MG/ML
0.3 INJECTION, SOLUTION, CONCENTRATE INTRAVENOUS PRN
OUTPATIENT
Start: 2022-02-14

## 2022-02-14 RX ORDER — ONDANSETRON 2 MG/ML
8 INJECTION INTRAMUSCULAR; INTRAVENOUS
OUTPATIENT
Start: 2022-02-14

## 2022-02-14 RX ORDER — SODIUM CHLORIDE 9 MG/ML
25 INJECTION, SOLUTION INTRAVENOUS PRN
OUTPATIENT
Start: 2022-02-14

## 2022-02-14 RX ORDER — ACETAMINOPHEN 325 MG/1
650 TABLET ORAL
OUTPATIENT
Start: 2022-02-14

## 2022-02-14 RX ORDER — HEPARIN SODIUM (PORCINE) LOCK FLUSH IV SOLN 100 UNIT/ML 100 UNIT/ML
500 SOLUTION INTRAVENOUS PRN
OUTPATIENT
Start: 2022-02-14

## 2022-02-14 RX ORDER — DIPHENHYDRAMINE HYDROCHLORIDE 50 MG/ML
50 INJECTION INTRAMUSCULAR; INTRAVENOUS
OUTPATIENT
Start: 2022-02-14

## 2022-02-14 RX ADMIN — Medication 500 MG: at 10:19

## 2022-02-14 NOTE — DISCHARGE SUMMARY
Patient here for Covid 19 Monoclonal antibody infusion. Patient observed for one hour post infusion. Patient tolerated well with vital signs as charted. Patient given discharge instructions and education. Patient reports having pulse ox at home.